# Patient Record
Sex: MALE | Race: OTHER | HISPANIC OR LATINO | URBAN - METROPOLITAN AREA
[De-identification: names, ages, dates, MRNs, and addresses within clinical notes are randomized per-mention and may not be internally consistent; named-entity substitution may affect disease eponyms.]

---

## 2018-02-22 ENCOUNTER — EMERGENCY (EMERGENCY)
Facility: HOSPITAL | Age: 58
LOS: 1 days | Discharge: HOME | End: 2018-02-22
Attending: EMERGENCY MEDICINE
Payer: MEDICARE

## 2018-02-22 VITALS
HEART RATE: 90 BPM | OXYGEN SATURATION: 99 % | WEIGHT: 139.99 LBS | SYSTOLIC BLOOD PRESSURE: 135 MMHG | HEIGHT: 66 IN | DIASTOLIC BLOOD PRESSURE: 80 MMHG | RESPIRATION RATE: 18 BRPM | TEMPERATURE: 98 F

## 2018-02-22 PROCEDURE — 93005 ELECTROCARDIOGRAM TRACING: CPT

## 2018-02-22 PROCEDURE — 99283 EMERGENCY DEPT VISIT LOW MDM: CPT

## 2018-02-22 PROCEDURE — 96372 THER/PROPH/DIAG INJ SC/IM: CPT

## 2018-02-22 PROCEDURE — 99283 EMERGENCY DEPT VISIT LOW MDM: CPT | Mod: 25

## 2018-02-22 PROCEDURE — 93010 ELECTROCARDIOGRAM REPORT: CPT

## 2018-02-22 RX ORDER — DIAZEPAM 5 MG
2.5 TABLET ORAL ONCE
Qty: 0 | Refills: 0 | Status: DISCONTINUED | OUTPATIENT
Start: 2018-02-22 | End: 2018-02-22

## 2018-02-22 RX ORDER — KETOROLAC TROMETHAMINE 30 MG/ML
30 SYRINGE (ML) INJECTION ONCE
Qty: 0 | Refills: 0 | Status: DISCONTINUED | OUTPATIENT
Start: 2018-02-22 | End: 2018-02-22

## 2018-02-22 RX ADMIN — Medication 30 MILLIGRAM(S): at 09:19

## 2018-02-22 RX ADMIN — Medication 2.5 MILLIGRAM(S): at 09:19

## 2018-02-22 RX ADMIN — Medication 30 MILLIGRAM(S): at 09:44

## 2018-02-22 NOTE — ED PROVIDER NOTE - MEDICAL DECISION MAKING DETAILS
Reproducible tenderness to chest wall w/ history of similar, pt states due to medication side effect. Normal exam. Will do EKG, IM Toradol, reassess

## 2018-02-22 NOTE — ED PROVIDER NOTE - OBJECTIVE STATEMENT
58 y/o M pt w/ PMHx of Schizophrenia presents to ED c/o L sided rib pain x5 days. Pt reports that 2 years ago he went to the hospital for the same pain which he thinks is due to a medication side effect; pt received an injection which resolved his symptoms. Pt reports no recent falls or injury. Pt states that he takes Haldol and Cogentin. Pt denies SOB, nausea/vomiting, or any other complaints. NKDA. 56 y/o M pt w/ PMHx of Schizophrenia presents to ED c/o L sided rib pain x5 days. Pt reports that 2 years ago he went to the hospital for the same pain which he thinks is due to a medication side effect; pt received an injection which resolved his symptoms. Pt reports no recent falls or injury. Pt states that he takes Haldol and Cogentin. Pt denies SOB, dizziness, nausea/vomiting, or any other complaints. NKDA.

## 2018-07-25 ENCOUNTER — INPATIENT (INPATIENT)
Facility: HOSPITAL | Age: 58
LOS: 2 days | Discharge: NURSING, REHAB OR LONG-TERM CARE | DRG: 195 | End: 2018-07-28
Attending: INTERNAL MEDICINE | Admitting: INTERNAL MEDICINE
Payer: MEDICARE

## 2018-07-25 VITALS
WEIGHT: 138.01 LBS | RESPIRATION RATE: 18 BRPM | HEIGHT: 66 IN | SYSTOLIC BLOOD PRESSURE: 117 MMHG | OXYGEN SATURATION: 98 % | DIASTOLIC BLOOD PRESSURE: 82 MMHG | TEMPERATURE: 99 F | HEART RATE: 128 BPM

## 2018-07-25 DIAGNOSIS — J18.9 PNEUMONIA, UNSPECIFIED ORGANISM: ICD-10-CM

## 2018-07-25 DIAGNOSIS — Z29.9 ENCOUNTER FOR PROPHYLACTIC MEASURES, UNSPECIFIED: ICD-10-CM

## 2018-07-25 DIAGNOSIS — F20.9 SCHIZOPHRENIA, UNSPECIFIED: ICD-10-CM

## 2018-07-25 LAB
ALBUMIN SERPL ELPH-MCNC: 4 G/DL — SIGNIFICANT CHANGE UP (ref 3.5–5)
ALP SERPL-CCNC: 128 U/L — HIGH (ref 40–120)
ALT FLD-CCNC: 26 U/L DA — SIGNIFICANT CHANGE UP (ref 10–60)
ANION GAP SERPL CALC-SCNC: 9 MMOL/L — SIGNIFICANT CHANGE UP (ref 5–17)
AST SERPL-CCNC: 24 U/L — SIGNIFICANT CHANGE UP (ref 10–40)
BASOPHILS # BLD AUTO: 0.1 K/UL — SIGNIFICANT CHANGE UP (ref 0–0.2)
BASOPHILS NFR BLD AUTO: 0.6 % — SIGNIFICANT CHANGE UP (ref 0–2)
BILIRUB SERPL-MCNC: 0.8 MG/DL — SIGNIFICANT CHANGE UP (ref 0.2–1.2)
BUN SERPL-MCNC: 7 MG/DL — SIGNIFICANT CHANGE UP (ref 7–18)
CALCIUM SERPL-MCNC: 9.1 MG/DL — SIGNIFICANT CHANGE UP (ref 8.4–10.5)
CHLORIDE SERPL-SCNC: 101 MMOL/L — SIGNIFICANT CHANGE UP (ref 96–108)
CHOLEST SERPL-MCNC: 108 MG/DL — SIGNIFICANT CHANGE UP (ref 10–199)
CO2 SERPL-SCNC: 25 MMOL/L — SIGNIFICANT CHANGE UP (ref 22–31)
CREAT SERPL-MCNC: 0.92 MG/DL — SIGNIFICANT CHANGE UP (ref 0.5–1.3)
D DIMER BLD IA.RAPID-MCNC: 494 NG/ML DDU — HIGH
EOSINOPHIL # BLD AUTO: 0 K/UL — SIGNIFICANT CHANGE UP (ref 0–0.5)
EOSINOPHIL NFR BLD AUTO: 0.1 % — SIGNIFICANT CHANGE UP (ref 0–6)
GLUCOSE SERPL-MCNC: 101 MG/DL — HIGH (ref 70–99)
HBA1C BLD-MCNC: 5.5 % — SIGNIFICANT CHANGE UP (ref 4–5.6)
HCT VFR BLD CALC: 52.5 % — HIGH (ref 39–50)
HDLC SERPL-MCNC: 30 MG/DL — LOW (ref 40–125)
HGB BLD-MCNC: 17.6 G/DL — HIGH (ref 13–17)
LACTATE SERPL-SCNC: 2.3 MMOL/L — HIGH (ref 0.7–2)
LACTATE SERPL-SCNC: 2.8 MMOL/L — HIGH (ref 0.7–2)
LIPID PNL WITH DIRECT LDL SERPL: 66 MG/DL — SIGNIFICANT CHANGE UP
LYMPHOCYTES # BLD AUTO: 1.8 K/UL — SIGNIFICANT CHANGE UP (ref 1–3.3)
LYMPHOCYTES # BLD AUTO: 10.6 % — LOW (ref 13–44)
MCHC RBC-ENTMCNC: 29.3 PG — SIGNIFICANT CHANGE UP (ref 27–34)
MCHC RBC-ENTMCNC: 33.5 GM/DL — SIGNIFICANT CHANGE UP (ref 32–36)
MCV RBC AUTO: 87.4 FL — SIGNIFICANT CHANGE UP (ref 80–100)
MONOCYTES # BLD AUTO: 1.2 K/UL — HIGH (ref 0–0.9)
MONOCYTES NFR BLD AUTO: 7.1 % — SIGNIFICANT CHANGE UP (ref 2–14)
NEUTROPHILS # BLD AUTO: 13.6 K/UL — HIGH (ref 1.8–7.4)
NEUTROPHILS NFR BLD AUTO: 81.6 % — HIGH (ref 43–77)
PLATELET # BLD AUTO: 214 K/UL — SIGNIFICANT CHANGE UP (ref 150–400)
POTASSIUM SERPL-MCNC: 3.8 MMOL/L — SIGNIFICANT CHANGE UP (ref 3.5–5.3)
POTASSIUM SERPL-SCNC: 3.8 MMOL/L — SIGNIFICANT CHANGE UP (ref 3.5–5.3)
PROT SERPL-MCNC: 8.5 G/DL — HIGH (ref 6–8.3)
RBC # BLD: 6 M/UL — HIGH (ref 4.2–5.8)
RBC # FLD: 12.9 % — SIGNIFICANT CHANGE UP (ref 10.3–14.5)
SODIUM SERPL-SCNC: 135 MMOL/L — SIGNIFICANT CHANGE UP (ref 135–145)
TOTAL CHOLESTEROL/HDL RATIO MEASUREMENT: 3.6 RATIO — SIGNIFICANT CHANGE UP (ref 3.4–9.6)
TRIGL SERPL-MCNC: 62 MG/DL — SIGNIFICANT CHANGE UP (ref 10–149)
TSH SERPL-MCNC: 0.4 UU/ML — SIGNIFICANT CHANGE UP (ref 0.34–4.82)
WBC # BLD: 16.6 K/UL — HIGH (ref 3.8–10.5)
WBC # FLD AUTO: 16.6 K/UL — HIGH (ref 3.8–10.5)

## 2018-07-25 PROCEDURE — 71046 X-RAY EXAM CHEST 2 VIEWS: CPT | Mod: 26

## 2018-07-25 PROCEDURE — 99285 EMERGENCY DEPT VISIT HI MDM: CPT

## 2018-07-25 PROCEDURE — 99223 1ST HOSP IP/OBS HIGH 75: CPT | Mod: AI,GC

## 2018-07-25 PROCEDURE — 71275 CT ANGIOGRAPHY CHEST: CPT | Mod: 26

## 2018-07-25 RX ORDER — SODIUM CHLORIDE 9 MG/ML
3 INJECTION INTRAMUSCULAR; INTRAVENOUS; SUBCUTANEOUS EVERY 8 HOURS
Qty: 0 | Refills: 0 | Status: COMPLETED | OUTPATIENT
Start: 2018-07-25 | End: 2018-07-26

## 2018-07-25 RX ORDER — ENOXAPARIN SODIUM 100 MG/ML
60 INJECTION SUBCUTANEOUS
Qty: 0 | Refills: 0 | Status: DISCONTINUED | OUTPATIENT
Start: 2018-07-25 | End: 2018-07-26

## 2018-07-25 RX ORDER — HALOPERIDOL DECANOATE 100 MG/ML
1 INJECTION INTRAMUSCULAR
Qty: 0 | Refills: 0 | COMMUNITY

## 2018-07-25 RX ORDER — SODIUM CHLORIDE 9 MG/ML
1000 INJECTION INTRAMUSCULAR; INTRAVENOUS; SUBCUTANEOUS ONCE
Qty: 0 | Refills: 0 | Status: COMPLETED | OUTPATIENT
Start: 2018-07-25 | End: 2018-07-25

## 2018-07-25 RX ORDER — CEFTRIAXONE 500 MG/1
1 INJECTION, POWDER, FOR SOLUTION INTRAMUSCULAR; INTRAVENOUS EVERY 24 HOURS
Qty: 0 | Refills: 0 | Status: DISCONTINUED | OUTPATIENT
Start: 2018-07-25 | End: 2018-07-28

## 2018-07-25 RX ORDER — BENZTROPINE MESYLATE 1 MG
1 TABLET ORAL
Qty: 0 | Refills: 0 | Status: DISCONTINUED | OUTPATIENT
Start: 2018-07-25 | End: 2018-07-28

## 2018-07-25 RX ORDER — HALOPERIDOL DECANOATE 100 MG/ML
10 INJECTION INTRAMUSCULAR
Qty: 0 | Refills: 0 | Status: DISCONTINUED | OUTPATIENT
Start: 2018-07-25 | End: 2018-07-28

## 2018-07-25 RX ORDER — HALOPERIDOL DECANOATE 100 MG/ML
5 INJECTION INTRAMUSCULAR
Qty: 0 | Refills: 0 | Status: DISCONTINUED | OUTPATIENT
Start: 2018-07-25 | End: 2018-07-25

## 2018-07-25 RX ORDER — AZITHROMYCIN 500 MG/1
500 TABLET, FILM COATED ORAL ONCE
Qty: 0 | Refills: 0 | Status: COMPLETED | OUTPATIENT
Start: 2018-07-25 | End: 2018-07-25

## 2018-07-25 RX ORDER — SODIUM CHLORIDE 9 MG/ML
1000 INJECTION INTRAMUSCULAR; INTRAVENOUS; SUBCUTANEOUS
Qty: 0 | Refills: 0 | Status: DISCONTINUED | OUTPATIENT
Start: 2018-07-25 | End: 2018-07-28

## 2018-07-25 RX ORDER — IPRATROPIUM/ALBUTEROL SULFATE 18-103MCG
3 AEROSOL WITH ADAPTER (GRAM) INHALATION ONCE
Qty: 0 | Refills: 0 | Status: COMPLETED | OUTPATIENT
Start: 2018-07-25 | End: 2018-07-25

## 2018-07-25 RX ORDER — HALOPERIDOL DECANOATE 100 MG/ML
15 INJECTION INTRAMUSCULAR
Qty: 0 | Refills: 0 | Status: DISCONTINUED | OUTPATIENT
Start: 2018-07-25 | End: 2018-07-28

## 2018-07-25 RX ORDER — ACETAMINOPHEN 500 MG
650 TABLET ORAL ONCE
Qty: 0 | Refills: 0 | Status: COMPLETED | OUTPATIENT
Start: 2018-07-25 | End: 2018-07-25

## 2018-07-25 RX ORDER — DIPHENHYDRAMINE HCL 50 MG
25 CAPSULE ORAL ONCE
Qty: 0 | Refills: 0 | Status: COMPLETED | OUTPATIENT
Start: 2018-07-25 | End: 2018-07-25

## 2018-07-25 RX ORDER — AZITHROMYCIN 500 MG/1
500 TABLET, FILM COATED ORAL EVERY 24 HOURS
Qty: 0 | Refills: 0 | Status: DISCONTINUED | OUTPATIENT
Start: 2018-07-25 | End: 2018-07-28

## 2018-07-25 RX ORDER — BENZTROPINE MESYLATE 1 MG
1 TABLET ORAL
Qty: 0 | Refills: 0 | COMMUNITY

## 2018-07-25 RX ORDER — CEFTRIAXONE 500 MG/1
1 INJECTION, POWDER, FOR SOLUTION INTRAMUSCULAR; INTRAVENOUS ONCE
Qty: 0 | Refills: 0 | Status: COMPLETED | OUTPATIENT
Start: 2018-07-25 | End: 2018-07-25

## 2018-07-25 RX ADMIN — SODIUM CHLORIDE 100 MILLILITER(S): 9 INJECTION INTRAMUSCULAR; INTRAVENOUS; SUBCUTANEOUS at 22:44

## 2018-07-25 RX ADMIN — Medication 650 MILLIGRAM(S): at 22:43

## 2018-07-25 RX ADMIN — AZITHROMYCIN 250 MILLIGRAM(S): 500 TABLET, FILM COATED ORAL at 22:42

## 2018-07-25 RX ADMIN — Medication 650 MILLIGRAM(S): at 09:38

## 2018-07-25 RX ADMIN — Medication 25 MILLIGRAM(S): at 22:42

## 2018-07-25 RX ADMIN — AZITHROMYCIN 250 MILLIGRAM(S): 500 TABLET, FILM COATED ORAL at 12:10

## 2018-07-25 RX ADMIN — SODIUM CHLORIDE 1000 MILLILITER(S): 9 INJECTION INTRAMUSCULAR; INTRAVENOUS; SUBCUTANEOUS at 10:10

## 2018-07-25 RX ADMIN — SODIUM CHLORIDE 1000 MILLILITER(S): 9 INJECTION INTRAMUSCULAR; INTRAVENOUS; SUBCUTANEOUS at 12:10

## 2018-07-25 RX ADMIN — CEFTRIAXONE 100 GRAM(S): 500 INJECTION, POWDER, FOR SOLUTION INTRAMUSCULAR; INTRAVENOUS at 12:23

## 2018-07-25 RX ADMIN — SODIUM CHLORIDE 1000 MILLILITER(S): 9 INJECTION INTRAMUSCULAR; INTRAVENOUS; SUBCUTANEOUS at 09:37

## 2018-07-25 RX ADMIN — CEFTRIAXONE 100 GRAM(S): 500 INJECTION, POWDER, FOR SOLUTION INTRAMUSCULAR; INTRAVENOUS at 23:42

## 2018-07-25 RX ADMIN — Medication 3 MILLILITER(S): at 09:38

## 2018-07-25 NOTE — ED PROVIDER NOTE - MEDICAL DECISION MAKING DETAILS
Patient with subjective fever, tachycardic, shortness of breath. Will obtain labs, x-ray, nebulizer and reassess.

## 2018-07-25 NOTE — H&P ADULT - PROBLEM SELECTOR PLAN 4
IMPROVE VTE Individual Risk Assessment          RISK                                                          Points  [  ] Previous VTE                                                3  [  ] Thrombophilia                                             2  [  ] Lower limb paralysis                                   2        (unable to hold up >15 seconds)    [  ] Current Cancer                                             2         (within 6 months)  [  ] Immobilization > 24 hrs                              1  [  ] ICU/CCU stay > 24 hours                             1  [  ] Age > 60                                                         1    IMPROVE VTE Score: 0   No risk factors for PE

## 2018-07-25 NOTE — H&P ADULT - NSHPLABSRESULTS_GEN_ALL_CORE
17.6   16.6  )-----------( 214      ( 25 Jul 2018 10:22 )             52.5     07-25    135  |  101  |  7   ----------------------------<  101<H>  3.8   |  25  |  0.92    Ca    9.1      25 Jul 2018 10:22    TPro  8.5<H>  /  Alb  4.0  /  TBili  0.8  /  DBili  x   /  AST  24  /  ALT  26  /  AlkPhos  128<H>  07-25      < from: Xray Chest 2 Views PA/Lat (07.25.18 @ 09:23) >    Ill-defined opacities in the left upper lobe may represent pneumonia.  No pneumothorax or pleural effusion.   The cardiomediastinal silhouette is within normal limits in size.    IMPRESSION:  Ill-defined opacities in the left upper lobe may represent pneumonia.   Follow-up imaging to resolution is recommended.      < end of copied text >

## 2018-07-25 NOTE — H&P ADULT - NSHPPHYSICALEXAM_GEN_ALL_CORE
GENERAL: NAD , no distress   HEENT: Normocephalic;  conjunctivae and sclerae clear; moist mucous membranes;   NECK : supple  CHEST/LUNG: Clear to auscultation bilaterally with good air entry ; mild rales in all lobes of lungs   HEART: S1 S2  regular; no murmurs, gallops or rubs  ABDOMEN: Soft, Nontender, Nondistended; Bowel sounds present  EXTREMITIES: no cyanosis; no edema; no calf tenderness  SKIN: warm and dry; no rash  NERVOUS SYSTEM:  Awake and alert; Oriented  to place, person and time ; no new deficits

## 2018-07-25 NOTE — H&P ADULT - NSHPSOCIALHISTORY_GEN_ALL_CORE
Lives in group home   Active smoker , 20 pack years   Former alcoholic , quit 10 years ago   No drugs

## 2018-07-25 NOTE — H&P ADULT - HISTORY OF PRESENT ILLNESS
57 yo male from group home ( with 2 roommates ) with PMH of Schizophrenia , comes in with cough , fever , weakness going on for 3 days. Patient describes his symptoms as sudden in onset, with a dry cough ,which he says now become productive with white sputum just today. No fever , no chills , no nausea , vomiting , diarrhea. Denies any sick contacts or hospitalization. No night sweats / no weight or appetite changes.   No recent travel / immobilization / h/o blood clots.    In Ed , BP : 117/ 82 mm hg , HR : 128 , Temp : 98.8 F  cbc shows elevated white count with Left shift , elevated h/h , Lactate of 2.3   bmp wnl   CXR shows Left upper lobe infiltrate

## 2018-07-25 NOTE — ED ADULT NURSE NOTE - ED STAT RN HANDOFF DETAILS
endorsed to DOMENIC Vásquez in holding area in stable condition for continuation of care. pt a&ox3, admitted for pneumonia. 20G RAC. ambulatory.

## 2018-07-25 NOTE — H&P ADULT - ASSESSMENT
57 yo male from group home ( with 2 roommates ) with PMH of Schizophrenia , comes in with cough , fever , weakness going on for 3 days. Patient describes his symptoms as sudden in onset, with a dry cough ,which he says now become productive with white sputum just today. No fever , no chills , no nausea , vomiting , diarrhea. Denies any sick contacts or hospitalization.   No recent travel / immobilization / h/o blood clots.    In Ed , BP : 117/ 82 mm hg , HR : 128 , Temp : 98.8 F  cbc shows elevated white count with Left shift , elevated h/h , Lactate of 2.3   bmp wnl   CXR shows Left upper lobe infiltrate     Will admit to medicine for Pneumonia and rule out PE.

## 2018-07-25 NOTE — H&P ADULT - ATTENDING COMMENTS
History was reviewed and the patient was examined in the ED and discussed with Dr. Ocampo.   Patient came with c/o cough, no fever or chills, no weight loss.   Previously he was seen in the ED February 22 with c/o pain in the left shoulder.    In ED he was found to be tachycardic.      PMH is positive for schizophrenia.  He lives in a group home.     Alert, cooperative man in NAD  Vital Signs Last 24 Hrs  T(C): 37.1 (25 Jul 2018 15:40), Max: 37.1 (25 Jul 2018 08:09)  T(F): 98.7 (25 Jul 2018 15:40), Max: 98.8 (25 Jul 2018 08:09)  HR: 133 (25 Jul 2018 15:40) (128 - 133)  BP: 144/83 (25 Jul 2018 15:40) (117/82 - 144/83)  BP(mean): --  RR: 18 (25 Jul 2018 15:40) (18 - 18)  SpO2: 98% (25 Jul 2018 15:40) (98% - 98%)  Lungs, bilateral air entry.  Bronchial breath sounds over the upper lung fields bilaterally.  No rales, no egophony, no post-tussive rales.   Cor, RRR  Abdomen, soft  Neurological, non-focal                          17.6   16.6  )-----------( 214      ( 25 Jul 2018 10:22 )             52.5       07-25    135  |  101  |  7   ----------------------------<  101<H>  3.8   |  25  |  0.92    Ca    9.1      25 Jul 2018 10:22    TPro  8.5<H>  /  Alb  4.0  /  TBili  0.8  /  DBili  x   /  AST  24  /  ALT  26  /  AlkPhos  128<H>  07-25                      Lactate Trend  07-25 @ 13:53 Lactate:2.8   07-25 @ 09:39 Lactate:2.3             CAPILLARY BLOOD GLUCOSE      POCT Blood Glucose.: 91 mg/dL (25 Jul 2018 16:16)        EKG shows sinus tachycardia    IMP: Cough and tachycardia give concern for pulmonary embolism.  R/o hyperthyroidism.   TABBY infiltrate and previous ED visit give concern for pulmonary TB.   Plan: CT angio to r/o PE          AFB isolation          Sputum AFB x 3          Quantiferon in AM          Empirical therapy for CAP          Urine Legionella antigen          TSH

## 2018-07-25 NOTE — ED PROVIDER NOTE - CARE PLAN
Principal Discharge DX:	PNA (pneumonia)  Secondary Diagnosis:	Tachycardia  Secondary Diagnosis:	Schizophrenia

## 2018-07-25 NOTE — ED ADULT NURSE NOTE - OBJECTIVE STATEMENT
pt a&ox3, here with c/o cough. pt states dry non-productive cough x3-4 days with headache and subjective fever. denies n/v, dizziness, CP.

## 2018-07-25 NOTE — H&P ADULT - PROBLEM SELECTOR PLAN 2
Patient is in no distress , however sats up to 93 with sinus tachycardia   unexplained by present symptoms except infection / dehydration   Will get d-dimer , as low probability of PE as of now

## 2018-07-25 NOTE — H&P ADULT - PROBLEM SELECTOR PLAN 1
Patient comes in with cough , low grade fever , headache with sob and chest tightness with cough  White count with leucocytosis   CXR s/o Left upper lobe infiltrate   Will start treatment with Rocephin and Zithromax   f/u blood cultures   Obtain sputum cultures if possible   Can send urine legionella , no gi symptoms or hyponatremia noted   Monitor for fever , hypotension , tachycardia

## 2018-07-26 LAB
24R-OH-CALCIDIOL SERPL-MCNC: 42.7 NG/ML — SIGNIFICANT CHANGE UP (ref 30–80)
ANION GAP SERPL CALC-SCNC: 6 MMOL/L — SIGNIFICANT CHANGE UP (ref 5–17)
APPEARANCE UR: CLEAR — SIGNIFICANT CHANGE UP
BASOPHILS # BLD AUTO: 0 K/UL — SIGNIFICANT CHANGE UP (ref 0–0.2)
BASOPHILS NFR BLD AUTO: 0.3 % — SIGNIFICANT CHANGE UP (ref 0–2)
BILIRUB UR-MCNC: NEGATIVE — SIGNIFICANT CHANGE UP
BUN SERPL-MCNC: 8 MG/DL — SIGNIFICANT CHANGE UP (ref 7–18)
CALCIUM SERPL-MCNC: 8.6 MG/DL — SIGNIFICANT CHANGE UP (ref 8.4–10.5)
CHLORIDE SERPL-SCNC: 109 MMOL/L — HIGH (ref 96–108)
CO2 SERPL-SCNC: 27 MMOL/L — SIGNIFICANT CHANGE UP (ref 22–31)
COLOR SPEC: YELLOW — SIGNIFICANT CHANGE UP
CREAT SERPL-MCNC: 0.74 MG/DL — SIGNIFICANT CHANGE UP (ref 0.5–1.3)
CULTURE RESULTS: NO GROWTH — SIGNIFICANT CHANGE UP
DIFF PNL FLD: NEGATIVE — SIGNIFICANT CHANGE UP
EOSINOPHIL # BLD AUTO: 0.1 K/UL — SIGNIFICANT CHANGE UP (ref 0–0.5)
EOSINOPHIL NFR BLD AUTO: 0.9 % — SIGNIFICANT CHANGE UP (ref 0–6)
GLUCOSE SERPL-MCNC: 96 MG/DL — SIGNIFICANT CHANGE UP (ref 70–99)
GLUCOSE UR QL: NEGATIVE — SIGNIFICANT CHANGE UP
HCT VFR BLD CALC: 45.2 % — SIGNIFICANT CHANGE UP (ref 39–50)
HGB BLD-MCNC: 14.9 G/DL — SIGNIFICANT CHANGE UP (ref 13–17)
KETONES UR-MCNC: ABNORMAL
LEGIONELLA AG UR QL: NEGATIVE — SIGNIFICANT CHANGE UP
LEUKOCYTE ESTERASE UR-ACNC: NEGATIVE — SIGNIFICANT CHANGE UP
LYMPHOCYTES # BLD AUTO: 1.7 K/UL — SIGNIFICANT CHANGE UP (ref 1–3.3)
LYMPHOCYTES # BLD AUTO: 13.1 % — SIGNIFICANT CHANGE UP (ref 13–44)
MAGNESIUM SERPL-MCNC: 2.2 MG/DL — SIGNIFICANT CHANGE UP (ref 1.6–2.6)
MCHC RBC-ENTMCNC: 29.2 PG — SIGNIFICANT CHANGE UP (ref 27–34)
MCHC RBC-ENTMCNC: 33 GM/DL — SIGNIFICANT CHANGE UP (ref 32–36)
MCV RBC AUTO: 88.5 FL — SIGNIFICANT CHANGE UP (ref 80–100)
MONOCYTES # BLD AUTO: 0.8 K/UL — SIGNIFICANT CHANGE UP (ref 0–0.9)
MONOCYTES NFR BLD AUTO: 6.4 % — SIGNIFICANT CHANGE UP (ref 2–14)
NEUTROPHILS # BLD AUTO: 10.1 K/UL — HIGH (ref 1.8–7.4)
NEUTROPHILS NFR BLD AUTO: 79.3 % — HIGH (ref 43–77)
NIGHT BLUE STAIN TISS: SIGNIFICANT CHANGE UP
NITRITE UR-MCNC: NEGATIVE — SIGNIFICANT CHANGE UP
PH UR: 7 — SIGNIFICANT CHANGE UP (ref 5–8)
PHOSPHATE SERPL-MCNC: 2.1 MG/DL — LOW (ref 2.5–4.5)
PLATELET # BLD AUTO: 172 K/UL — SIGNIFICANT CHANGE UP (ref 150–400)
POTASSIUM SERPL-MCNC: 4.3 MMOL/L — SIGNIFICANT CHANGE UP (ref 3.5–5.3)
POTASSIUM SERPL-SCNC: 4.3 MMOL/L — SIGNIFICANT CHANGE UP (ref 3.5–5.3)
PROT UR-MCNC: NEGATIVE — SIGNIFICANT CHANGE UP
RBC # BLD: 5.1 M/UL — SIGNIFICANT CHANGE UP (ref 4.2–5.8)
RBC # FLD: 12.6 % — SIGNIFICANT CHANGE UP (ref 10.3–14.5)
SODIUM SERPL-SCNC: 142 MMOL/L — SIGNIFICANT CHANGE UP (ref 135–145)
SP GR SPEC: 1 — LOW (ref 1.01–1.02)
SPECIMEN SOURCE: SIGNIFICANT CHANGE UP
SPECIMEN SOURCE: SIGNIFICANT CHANGE UP
UROBILINOGEN FLD QL: NEGATIVE — SIGNIFICANT CHANGE UP
VIT B12 SERPL-MCNC: 308 PG/ML — SIGNIFICANT CHANGE UP (ref 232–1245)
WBC # BLD: 12.7 K/UL — HIGH (ref 3.8–10.5)
WBC # FLD AUTO: 12.7 K/UL — HIGH (ref 3.8–10.5)

## 2018-07-26 PROCEDURE — 99233 SBSQ HOSP IP/OBS HIGH 50: CPT | Mod: GC

## 2018-07-26 RX ORDER — SODIUM,POTASSIUM PHOSPHATES 278-250MG
1 POWDER IN PACKET (EA) ORAL
Qty: 0 | Refills: 0 | Status: COMPLETED | OUTPATIENT
Start: 2018-07-26 | End: 2018-07-28

## 2018-07-26 RX ADMIN — CEFTRIAXONE 100 GRAM(S): 500 INJECTION, POWDER, FOR SOLUTION INTRAMUSCULAR; INTRAVENOUS at 22:28

## 2018-07-26 RX ADMIN — ENOXAPARIN SODIUM 60 MILLIGRAM(S): 100 INJECTION SUBCUTANEOUS at 05:30

## 2018-07-26 RX ADMIN — Medication 1 TABLET(S): at 12:42

## 2018-07-26 RX ADMIN — Medication 1 TABLET(S): at 10:41

## 2018-07-26 RX ADMIN — Medication 1 TABLET(S): at 22:29

## 2018-07-26 RX ADMIN — HALOPERIDOL DECANOATE 10 MILLIGRAM(S): 100 INJECTION INTRAMUSCULAR at 05:30

## 2018-07-26 RX ADMIN — Medication 1 TABLET(S): at 17:18

## 2018-07-26 RX ADMIN — Medication 1 MILLIGRAM(S): at 05:30

## 2018-07-26 RX ADMIN — Medication 1 MILLIGRAM(S): at 17:18

## 2018-07-26 RX ADMIN — AZITHROMYCIN 250 MILLIGRAM(S): 500 TABLET, FILM COATED ORAL at 22:28

## 2018-07-26 NOTE — PROGRESS NOTE ADULT - SUBJECTIVE AND OBJECTIVE BOX
PGY 2 Note discussed with primary attending    Patient is a 58y old  Male who presents with a chief complaint of cough , low grade fever (25 Jul 2018 12:36)      INTERVAL HPI/OVERNIGHT EVENTS: No acute events reported overnight.    Today pt presents in no acute distress.  Pt found resting comfortably in bed.  No nnew complaints reported today      MEDICATIONS  (STANDING):  azithromycin  IVPB 500 milliGRAM(s) IV Intermittent every 24 hours  benztropine 1 milliGRAM(s) Oral two times a day  cefTRIAXone   IVPB 1 Gram(s) IV Intermittent every 24 hours  enoxaparin Injectable 60 milliGRAM(s) SubCutaneous two times a day  haloperidol     Tablet 10 milliGRAM(s) Oral <User Schedule>  haloperidol     Tablet 15 milliGRAM(s) Oral <User Schedule>  sodium chloride 0.9%. 1000 milliLiter(s) (100 mL/Hr) IV Continuous <Continuous>    MEDICATIONS  (PRN):  guaiFENesin    Syrup 100 milliGRAM(s) Oral every 6 hours PRN Cough      __________________________________________________  REVIEW OF SYSTEMS:    CONSTITUTIONAL: No fever,   EYES: no acute visual disturbances  NECK: No pain or stiffness  RESPIRATORY: + cough; No shortness of breath  CARDIOVASCULAR: No chest pain, no palpitations  GASTROINTESTINAL: No pain. No nausea or vomiting; No diarrhea   NEUROLOGICAL: No headache or numbness, no tremors  MUSCULOSKELETAL: No joint pain, no muscle pain      Vital Signs Last 24 Hrs  T(C): 36.7 (26 Jul 2018 05:49), Max: 38.4 (25 Jul 2018 22:04)  T(F): 98 (26 Jul 2018 05:49), Max: 101.2 (25 Jul 2018 22:04)  HR: 99 (26 Jul 2018 05:49) (99 - 133)  BP: 120/76 (26 Jul 2018 05:49) (117/82 - 144/83)  RR: 18 (26 Jul 2018 05:49) (18 - 18)  SpO2: 97% (26 Jul 2018 05:49) (96% - 98%)    ________________________________________________  PHYSICAL EXAM:  GENERAL: NAD  HEENT: Normocephalic;  conjunctivae and sclerae clear; moist mucous membranes;   NECK : supple  CHEST/LUNG: Clear to auscultation bilaterally with good air entry   HEART: S1 S2  regular; no murmurs, gallops or rubs  ABDOMEN: Soft, Nontender, Nondistended; Bowel sounds present  EXTREMITIES: no cyanosis; no edema; no calf tenderness  NERVOUS SYSTEM:  Awake and alert; Oriented  to place, person and time ; no new deficits    _________________________________________________  LABS:        CAPILLARY BLOOD GLUCOSE      POCT Blood Glucose.: 91 mg/dL (25 Jul 2018 16:16)        RADIOLOGY & ADDITIONAL TESTS:    Imaging Personally Reviewed:  YES    Consultant(s) Notes Reviewed:   YES    Care Discussed with Consultants :     Plan of care was discussed with patient and /or primary care giver; all questions and concerns were addressed and care was aligned with patient's wishes.

## 2018-07-26 NOTE — CONSULT NOTE ADULT - GASTROINTESTINAL DETAILS
no rigidity/no organomegaly/nontender/no distention/no masses palpable/bowel sounds normal/soft/no guarding

## 2018-07-26 NOTE — CONSULT NOTE ADULT - ASSESSMENT
Multifocal pneumonia - doubt Tuberculosis  fever  leukocytosis    plan - cont rocephin 1 gm iv q24 hrs  cont azithromycin 500mgs iv q24 hrs  3 sputums to r/o TB

## 2018-07-26 NOTE — PROGRESS NOTE ADULT - ASSESSMENT
59 yo male from group home ( with 2 roommates ) with PMH of Schizophrenia , comes in with cough , fever , weakness going on for 3 days. Patient describes his symptoms as sudden in onset, with a dry cough ,which he says now become productive with white sputum just today. No fever , no chills , no nausea , vomiting , diarrhea. Denies any sick contacts or hospitalization.   No recent travel / immobilization / h/o blood clots.    In Ed , BP : 117/ 82 mm hg , HR : 128 , Temp : 98.8 F  cbc shows elevated white count with Left shift , elevated h/h , Lactate of 2.3   bmp wnl   CXR shows Left upper lobe infiltrate     Will admit to medicine for Pneumonia and rule out PE. 57 yo male from group home ( with 2 roommates ) with PMH of Schizophrenia , comes in with cough , fever , weakness going on for 3 days. Patient describes his symptoms as sudden in onset, with a dry cough ,which he says now become productive with white sputum just today. No fever , no chills , no nausea , vomiting , diarrhea. Denies any sick contacts or hospitalization.   No recent travel / immobilization / h/o blood clots.    In Ed , BP : 117/ 82 mm hg , HR : 128 , Temp : 98.8 F  cbc shows elevated white count with Left shift , elevated h/h , Lactate of 2.3   bmp wnl   CXR shows Left upper lobe infiltrate     Admitted to medicine for Pneumonia

## 2018-07-26 NOTE — PROGRESS NOTE ADULT - PROBLEM SELECTOR PLAN 1
-Patient comes in with cough , low grade fever , headache with sob and chest tightness with cough  -White count with leucocytosis   -CT chest sig for multilobar pneumonia  -continue with Rocephin and Zithromax   -f/u blood cultures   -Obtain sputum cultures  -f/u urine legionella  -Monitor for fever, hypotension, tachycardia -Patient comes in with cough , low grade fever , headache with sob and chest tightness with cough  -White count with leucocytosis   -CT chest sig for multilobar pneumonia  -continue with Rocephin and Zithromax   -f/u blood cultures   -Obtain sputum cultures- 1/2 sent   -f/u qgold   -f/u urine legionella  -Monitor for fever, hypotension, tachycardia -Patient comes in with cough , low grade fever , headache with sob and chest tightness with cough  -White count with leucocytosis   -CT chest sig for multilobar pneumonia  -continue with Rocephin and Zithromax   -f/u blood cultures   -Obtain sputum cultures- 1/3 sent   -f/u qgold   -f/u urine legionella  -Monitor for fever, hypotension, tachycardia

## 2018-07-26 NOTE — PROGRESS NOTE ADULT - ATTENDING COMMENTS
Patient  was examined and discussed with Dr. Phan.     He continues to have cough, which he says he has had for "a few days."    Alert, cooperative man in NAD  Vital Signs Last 24 Hrs  T(C): 36.1 (26 Jul 2018 15:00), Max: 38.4 (25 Jul 2018 22:04)  T(F): 97 (26 Jul 2018 15:00), Max: 101.2 (25 Jul 2018 22:04)  HR: 97 (26 Jul 2018 15:00) (97 - 125)  BP: 133/77 (26 Jul 2018 15:00) (120/76 - 133/77)  BP(mean): --  RR: 17 (26 Jul 2018 15:00) (17 - 18)  SpO2: 100% (26 Jul 2018 15:00) (96% - 100%)  Lungs, rales and egophony R mid lung, bronchial bs in upper lobes  Cor, RRR  Abdomen, soft  Neurological, intact                        14.9   12.7  )-----------( 172      ( 26 Jul 2018 07:52 )             45.2   07-26    142  |  109<H>  |  8   ----------------------------<  96  4.3   |  27  |  0.74    Ca    8.6      26 Jul 2018 07:52  Phos  2.1     07-26  Mg     2.2     07-26    TPro  8.5<H>  /  Alb  4.0  /  TBili  0.8  /  DBili  x   /  AST  24  /  ALT  26  /  AlkPhos  128<H>  07-25    Legionella Antigen, Urine: Negative (07.25.18 @ 23:01)    < from: CT Angio Chest w/ IV Cont (07.25.18 @ 16:12) >      Limited evaluation for segmental and subsegmental pulmonary embolism. No   main, left, right, or lobar pulmonary embolism.    Multifocal pneumonia. Recommend follow-up in 3-4 weeks after treatment.    Centrilobular emphysema.    Solid right lower lobe nodule measuring 7 mm. Repeat chest CT in 3-6   months is recommended for follow-up evaluation.    < end of copied text >    IMP:  No evidence of pulmonary embolism.           Multilobar pneumonia.  Clinical picture is most c/w mycoplasma, but TB needs to be excluded in this patient  Plan:  Empirical antibiotics for CAP           AFB isolation.  Sputum AFB x 3.  One has already been received.            ID consultation, Dr. Wolf

## 2018-07-26 NOTE — CONSULT NOTE ADULT - SUBJECTIVE AND OBJECTIVE BOX
HPI:  59 yo male from group home ( with 2 roommates ) with PMH of Schizophrenia , comes in with cough , fever , weakness going on for 3 days. Patient describes his symptoms as sudden in onset, with a dry cough ,which he says now become productive with white sputum just today. No fever , no chills , no nausea , vomiting , diarrhea. Denies any sick contacts or hospitalization. No night sweats / no weight or appetite changes.   No recent travel / immobilization / h/o blood clots.          PAST MEDICAL & SURGICAL HISTORY:  Schizophrenia  No significant past surgical history      No Known Allergies      Meds:  azithromycin  IVPB 500 milliGRAM(s) IV Intermittent every 24 hours  benztropine 1 milliGRAM(s) Oral two times a day  cefTRIAXone   IVPB 1 Gram(s) IV Intermittent every 24 hours  guaiFENesin    Syrup 100 milliGRAM(s) Oral every 6 hours PRN  haloperidol     Tablet 10 milliGRAM(s) Oral <User Schedule>  haloperidol     Tablet 15 milliGRAM(s) Oral <User Schedule>  potassium acid phosphate/sodium acid phosphate tablet (K-PHOS No. 2) 1 Tablet(s) Oral four times a day with meals  sodium chloride 0.9%. 1000 milliLiter(s) IV Continuous <Continuous>      SOCIAL HISTORY:  Smoker:    ETOH use:      FAMILY HISTORY:      VITALS:  Vital Signs Last 24 Hrs  T(C): 36.7 (26 Jul 2018 05:49), Max: 38.4 (25 Jul 2018 22:04)  T(F): 98 (26 Jul 2018 05:49), Max: 101.2 (25 Jul 2018 22:04)  HR: 99 (26 Jul 2018 05:49) (99 - 133)  BP: 120/76 (26 Jul 2018 05:49) (120/76 - 144/83)  BP(mean): --  RR: 18 (26 Jul 2018 05:49) (18 - 18)  SpO2: 97% (26 Jul 2018 05:49) (96% - 98%)    LABS/DIAGNOSTIC TESTS:                          14.9   12.7  )-----------( 172      ( 26 Jul 2018 07:52 )             45.2     WBC Count: 12.7 K/uL (07-26 @ 07:52)  WBC Count: 16.6 K/uL (07-25 @ 10:22)      07-26    142  |  109<H>  |  8   ----------------------------<  96  4.3   |  27  |  0.74    Ca    8.6      26 Jul 2018 07:52  Phos  2.1     07-26  Mg     2.2     07-26    TPro  8.5<H>  /  Alb  4.0  /  TBili  0.8  /  DBili  x   /  AST  24  /  ALT  26  /  AlkPhos  128<H>  07-25          LIVER FUNCTIONS - ( 25 Jul 2018 10:22 )  Alb: 4.0 g/dL / Pro: 8.5 g/dL / ALK PHOS: 128 U/L / ALT: 26 U/L DA / AST: 24 U/L / GGT: x                 LACTATE:Lactate, Blood: 2.8 mmol/L (07-25 @ 13:53)      ABG -     CULTURES:       RADIOLOGY:  < from: CT Angio Chest w/ IV Cont (07.25.18 @ 16:12) >  EXAM:  CT ANGIO CHEST (W)AW IC                            PROCEDURE DATE:  07/25/2018          INTERPRETATION:  CLINICAL INFORMATION: Cough. Tachypnea.    COMPARISON: None    PROCEDURE:   CT Angiography of the Chest.  90 ml of Omnipaque 350 was injected intravenously. 10 ml were discarded.  Sagittal and coronal reformats were performed as well as 3D (MIP)   reconstructions.      FINDINGS:    LUNGS AND LARGE AIRWAYS: Patent central airways. Severe bronchial wall   thickening and areas of mucoid impaction within the bilateral lower lobes.    Moderate-severe centrilobular emphysema. Consolidation within all lobes   with sparing of the right middle lobe consistent with multifocal   pneumonia. Right middle lobe nodule measuring 7 mm (3, 94).    PLEURA: Trace bilateral pleural effusions.    VESSELS: Limited evaluation for segmental and subsegmental pulmonary   embolism. No main, left, right, or lobar pulmonary embolism. The main   pulmonary arteries normal in diameter. No aortic aneurysm or dissection.    HEART: Cardiomegaly. No pericardial effusion. No CT evidence of right   heart strain.    MEDIASTINUM AND ANNELIESE: Subcentimeter mediastinal and bilateral hilar lymph   nodes.    CHEST WALL AND LOWER NECK: Within normal limits.    VISUALIZEDUPPER ABDOMEN: Cholelithiasis.    BONES: Within normal limits.    IMPRESSION:     Limited evaluation for segmental and subsegmental pulmonary embolism. No   main, left, right, or lobar pulmonary embolism.    Multifocal pneumonia. Recommend follow-up in 3-4 weeks after treatment.    Centrilobular emphysema.    Solid right lower lobe nodule measuring 7 mm. Repeat chest CT in 3-6   months is recommended for follow-up evaluation.    -----------------------------------------------------------------------------------------------------------------------------------------    < from: Xray Chest 2 Views PA/Lat (07.25.18 @ 09:23) >  EXAM:  XR CHEST PA LAT 2V                            PROCEDURE DATE:  07/25/2018          INTERPRETATION:  PA AND LATERAL CHEST X-RAYS    HISTORY: 58 years old. Male. cough.    COMPARISON: No prior study is available for comparison.    FINDINGS:  Ill-defined opacities in the left upper lobe may represent pneumonia.  No pneumothorax or pleural effusion.   The cardiomediastinal silhouette is within normal limits in size.    IMPRESSION:  Ill-defined opacities in the left upper lobe may represent pneumonia.   Follow-up imaging to resolution is recommended.      < end of copied text >    ROS  [  ] UNABLE TO ELICIT HPI:  57 yo male from group home ( with 2 roommates ) with PMH of Schizophrenia , comes in with cough , fever , weakness going on for 3 days. Patient describes his symptoms as sudden in onset, with a dry cough ,which he says now become productive with white sputum just today. No fever , no chills , no nausea , vomiting , diarrhea. Denies any sick contacts or hospitalization. No night sweats / no weight or appetite changes.   No recent travel / immobilization / h/o blood clots.  pt denies any hemoptysis and his symptoms have only been around for 3-4 days, he has not had any weight loss and his roommates are not ill. He has no night sweats either.          PAST MEDICAL & SURGICAL HISTORY:  Schizophrenia  No significant past surgical history      No Known Allergies      Meds:  azithromycin  IVPB 500 milliGRAM(s) IV Intermittent every 24 hours  benztropine 1 milliGRAM(s) Oral two times a day  cefTRIAXone   IVPB 1 Gram(s) IV Intermittent every 24 hours  guaiFENesin    Syrup 100 milliGRAM(s) Oral every 6 hours PRN  haloperidol     Tablet 10 milliGRAM(s) Oral <User Schedule>  haloperidol     Tablet 15 milliGRAM(s) Oral <User Schedule>  potassium acid phosphate/sodium acid phosphate tablet (K-PHOS No. 2) 1 Tablet(s) Oral four times a day with meals  sodium chloride 0.9%. 1000 milliLiter(s) IV Continuous <Continuous>      SOCIAL HISTORY:  Smoker: no   ETOH use: no     FAMILY HISTORY: not contributory      VITALS:  Vital Signs Last 24 Hrs  T(C): 36.7 (26 Jul 2018 05:49), Max: 38.4 (25 Jul 2018 22:04)  T(F): 98 (26 Jul 2018 05:49), Max: 101.2 (25 Jul 2018 22:04)  HR: 99 (26 Jul 2018 05:49) (99 - 133)  BP: 120/76 (26 Jul 2018 05:49) (120/76 - 144/83)  BP(mean): --  RR: 18 (26 Jul 2018 05:49) (18 - 18)  SpO2: 97% (26 Jul 2018 05:49) (96% - 98%)    LABS/DIAGNOSTIC TESTS:                          14.9   12.7  )-----------( 172      ( 26 Jul 2018 07:52 )             45.2     WBC Count: 12.7 K/uL (07-26 @ 07:52)  WBC Count: 16.6 K/uL (07-25 @ 10:22)      07-26    142  |  109<H>  |  8   ----------------------------<  96  4.3   |  27  |  0.74    Ca    8.6      26 Jul 2018 07:52  Phos  2.1     07-26  Mg     2.2     07-26    TPro  8.5<H>  /  Alb  4.0  /  TBili  0.8  /  DBili  x   /  AST  24  /  ALT  26  /  AlkPhos  128<H>  07-25          LIVER FUNCTIONS - ( 25 Jul 2018 10:22 )  Alb: 4.0 g/dL / Pro: 8.5 g/dL / ALK PHOS: 128 U/L / ALT: 26 U/L DA / AST: 24 U/L / GGT: x                 LACTATE:Lactate, Blood: 2.8 mmol/L (07-25 @ 13:53)      ABG -     CULTURES:       RADIOLOGY:  < from: CT Angio Chest w/ IV Cont (07.25.18 @ 16:12) >  EXAM:  CT ANGIO CHEST (W)AW IC                            PROCEDURE DATE:  07/25/2018          INTERPRETATION:  CLINICAL INFORMATION: Cough. Tachypnea.    COMPARISON: None    PROCEDURE:   CT Angiography of the Chest.  90 ml of Omnipaque 350 was injected intravenously. 10 ml were discarded.  Sagittal and coronal reformats were performed as well as 3D (MIP)   reconstructions.      FINDINGS:    LUNGS AND LARGE AIRWAYS: Patent central airways. Severe bronchial wall   thickening and areas of mucoid impaction within the bilateral lower lobes.    Moderate-severe centrilobular emphysema. Consolidation within all lobes   with sparing of the right middle lobe consistent with multifocal   pneumonia. Right middle lobe nodule measuring 7 mm (3, 94).    PLEURA: Trace bilateral pleural effusions.    VESSELS: Limited evaluation for segmental and subsegmental pulmonary   embolism. No main, left, right, or lobar pulmonary embolism. The main   pulmonary arteries normal in diameter. No aortic aneurysm or dissection.    HEART: Cardiomegaly. No pericardial effusion. No CT evidence of right   heart strain.    MEDIASTINUM AND ANNELIESE: Subcentimeter mediastinal and bilateral hilar lymph   nodes.    CHEST WALL AND LOWER NECK: Within normal limits.    VISUALIZEDUPPER ABDOMEN: Cholelithiasis.    BONES: Within normal limits.    IMPRESSION:     Limited evaluation for segmental and subsegmental pulmonary embolism. No   main, left, right, or lobar pulmonary embolism.    Multifocal pneumonia. Recommend follow-up in 3-4 weeks after treatment.    Centrilobular emphysema.    Solid right lower lobe nodule measuring 7 mm. Repeat chest CT in 3-6   months is recommended for follow-up evaluation.    -----------------------------------------------------------------------------------------------------------------------------------------    < from: Xray Chest 2 Views PA/Lat (07.25.18 @ 09:23) >  EXAM:  XR CHEST PA LAT 2V                            PROCEDURE DATE:  07/25/2018          INTERPRETATION:  PA AND LATERAL CHEST X-RAYS    HISTORY: 58 years old. Male. cough.    COMPARISON: No prior study is available for comparison.    FINDINGS:  Ill-defined opacities in the left upper lobe may represent pneumonia.  No pneumothorax or pleural effusion.   The cardiomediastinal silhouette is within normal limits in size.    IMPRESSION:  Ill-defined opacities in the left upper lobe may represent pneumonia.   Follow-up imaging to resolution is recommended.      < end of copied text >    ROS  [  ] UNABLE TO ELICIT

## 2018-07-27 ENCOUNTER — TRANSCRIPTION ENCOUNTER (OUTPATIENT)
Age: 58
End: 2018-07-27

## 2018-07-27 LAB
ANION GAP SERPL CALC-SCNC: 10 MMOL/L — SIGNIFICANT CHANGE UP (ref 5–17)
BUN SERPL-MCNC: 6 MG/DL — LOW (ref 7–18)
CALCIUM SERPL-MCNC: 8.9 MG/DL — SIGNIFICANT CHANGE UP (ref 8.4–10.5)
CHLORIDE SERPL-SCNC: 106 MMOL/L — SIGNIFICANT CHANGE UP (ref 96–108)
CO2 SERPL-SCNC: 23 MMOL/L — SIGNIFICANT CHANGE UP (ref 22–31)
CREAT SERPL-MCNC: 0.7 MG/DL — SIGNIFICANT CHANGE UP (ref 0.5–1.3)
GLUCOSE SERPL-MCNC: 80 MG/DL — SIGNIFICANT CHANGE UP (ref 70–99)
HCT VFR BLD CALC: 45.7 % — SIGNIFICANT CHANGE UP (ref 39–50)
HGB BLD-MCNC: 15.2 G/DL — SIGNIFICANT CHANGE UP (ref 13–17)
HIV 1+2 AB+HIV1 P24 AG SERPL QL IA: SIGNIFICANT CHANGE UP
M TB TUBERC IFN-G BLD QL: -0.02 IU/ML — SIGNIFICANT CHANGE UP
M TB TUBERC IFN-G BLD QL: 0.04 IU/ML — SIGNIFICANT CHANGE UP
M TB TUBERC IFN-G BLD QL: NEGATIVE — SIGNIFICANT CHANGE UP
MCHC RBC-ENTMCNC: 29.1 PG — SIGNIFICANT CHANGE UP (ref 27–34)
MCHC RBC-ENTMCNC: 33.4 GM/DL — SIGNIFICANT CHANGE UP (ref 32–36)
MCV RBC AUTO: 87.3 FL — SIGNIFICANT CHANGE UP (ref 80–100)
MITOGEN IGNF BCKGRD COR BLD-ACNC: 0.84 IU/ML — SIGNIFICANT CHANGE UP
NIGHT BLUE STAIN TISS: SIGNIFICANT CHANGE UP
PLATELET # BLD AUTO: 212 K/UL — SIGNIFICANT CHANGE UP (ref 150–400)
POTASSIUM SERPL-MCNC: 3.8 MMOL/L — SIGNIFICANT CHANGE UP (ref 3.5–5.3)
POTASSIUM SERPL-SCNC: 3.8 MMOL/L — SIGNIFICANT CHANGE UP (ref 3.5–5.3)
RBC # BLD: 5.24 M/UL — SIGNIFICANT CHANGE UP (ref 4.2–5.8)
RBC # FLD: 12.1 % — SIGNIFICANT CHANGE UP (ref 10.3–14.5)
SODIUM SERPL-SCNC: 139 MMOL/L — SIGNIFICANT CHANGE UP (ref 135–145)
SPECIMEN SOURCE: SIGNIFICANT CHANGE UP
WBC # BLD: 9.8 K/UL — SIGNIFICANT CHANGE UP (ref 3.8–10.5)
WBC # FLD AUTO: 9.8 K/UL — SIGNIFICANT CHANGE UP (ref 3.8–10.5)

## 2018-07-27 RX ORDER — NICOTINE POLACRILEX 2 MG
2 GUM BUCCAL
Qty: 0 | Refills: 0 | Status: DISCONTINUED | OUTPATIENT
Start: 2018-07-27 | End: 2018-07-27

## 2018-07-27 RX ORDER — NICOTINE POLACRILEX 2 MG
2 GUM BUCCAL EVERY 6 HOURS
Qty: 0 | Refills: 0 | Status: DISCONTINUED | OUTPATIENT
Start: 2018-07-27 | End: 2018-07-27

## 2018-07-27 RX ORDER — NICOTINE POLACRILEX 2 MG
2 GUM BUCCAL EVERY 6 HOURS
Qty: 0 | Refills: 0 | Status: DISCONTINUED | OUTPATIENT
Start: 2018-07-27 | End: 2018-07-28

## 2018-07-27 RX ADMIN — Medication 2 MILLIGRAM(S): at 17:44

## 2018-07-27 RX ADMIN — CEFTRIAXONE 100 GRAM(S): 500 INJECTION, POWDER, FOR SOLUTION INTRAMUSCULAR; INTRAVENOUS at 23:32

## 2018-07-27 RX ADMIN — HALOPERIDOL DECANOATE 10 MILLIGRAM(S): 100 INJECTION INTRAMUSCULAR at 06:17

## 2018-07-27 RX ADMIN — Medication 1 MILLIGRAM(S): at 06:17

## 2018-07-27 RX ADMIN — Medication 1 TABLET(S): at 12:17

## 2018-07-27 RX ADMIN — Medication 1 TABLET(S): at 08:56

## 2018-07-27 RX ADMIN — Medication 2 MILLIGRAM(S): at 23:58

## 2018-07-27 RX ADMIN — Medication 1 TABLET(S): at 22:14

## 2018-07-27 RX ADMIN — AZITHROMYCIN 250 MILLIGRAM(S): 500 TABLET, FILM COATED ORAL at 22:14

## 2018-07-27 RX ADMIN — Medication 1 TABLET(S): at 17:44

## 2018-07-27 RX ADMIN — Medication 1 MILLIGRAM(S): at 17:44

## 2018-07-27 NOTE — DISCHARGE NOTE ADULT - PLAN OF CARE
Please complete antibiotics and follow up with your PCP You presented with a cough and were found to have a multifocal pneumonia.  You were started on IV abx for community acquired pneumonia.  You were ruled out for Tb.   Please continue current antibiotic regimen, and schedule a close follow  up with your PCP. Please continue current medication regimen, and follow up with your PCP

## 2018-07-27 NOTE — DISCHARGE NOTE ADULT - PATIENT PORTAL LINK FT
You can access the [facility] Patient Portal, offered by [facility], by registering with the following website: [email or web address]

## 2018-07-27 NOTE — PROGRESS NOTE ADULT - PROBLEM SELECTOR PLAN 1
-Patient comes in with cough , low grade fever , headache with sob and chest tightness with cough  -White count with leucocytosis   -CT chest sig for multilobar pneumonia  -continue with Rocephin and Zithromax   -f/u blood cultures   -Obtain sputum cultures- 2/3 sent   -f/u qgold   -urine legionella neg  -Monitor for fever, hypotension, tachycardia -Patient comes in with cough , low grade fever , headache with sob and chest tightness with cough  -elev white count with leukocytosis   -CT chest sig for multilobar pneumonia  -continue with Rocephin and Zithromax   -blood cultures NGTD  -Obtain sputum cultures- 3/3 sent - 1/3 neg  -f/u qgold   -urine legionella neg  -Monitor for fever, hypotension, tachycardia

## 2018-07-27 NOTE — PROGRESS NOTE ADULT - SUBJECTIVE AND OBJECTIVE BOX
PGY 2 Note discussed with primary attending    Patient is a 58y old  Male who presents with a chief complaint of cough , low grade fever (25 Jul 2018 12:36)      INTERVAL HPI/OVERNIGHT EVENTS: No acute events reported overnight.    Today pt presents in no acute distress.  Pt found resting comfortably in bed.  Cough improving. No nnew complaints reported today    MEDICATIONS  (STANDING):  azithromycin  IVPB 500 milliGRAM(s) IV Intermittent every 24 hours  benztropine 1 milliGRAM(s) Oral two times a day  cefTRIAXone   IVPB 1 Gram(s) IV Intermittent every 24 hours  haloperidol     Tablet 10 milliGRAM(s) Oral <User Schedule>  haloperidol     Tablet 15 milliGRAM(s) Oral <User Schedule>  potassium acid phosphate/sodium acid phosphate tablet (K-PHOS No. 2) 1 Tablet(s) Oral four times a day with meals  sodium chloride 0.9%. 1000 milliLiter(s) (100 mL/Hr) IV Continuous <Continuous>    MEDICATIONS  (PRN):  guaiFENesin    Syrup 100 milliGRAM(s) Oral every 6 hours PRN Cough  ________________________________________________  REVIEW OF SYSTEMS:    CONSTITUTIONAL: No fever,   EYES: no acute visual disturbances  NECK: No pain or stiffness  RESPIRATORY: + cough; No shortness of breath  CARDIOVASCULAR: No chest pain, no palpitations  GASTROINTESTINAL: No pain. No nausea or vomiting; No diarrhea   NEUROLOGICAL: No headache or numbness, no tremors  MUSCULOSKELETAL: No joint pain, no muscle pain    Vital Signs Last 24 Hrs  T(C): 36.8 (27 Jul 2018 05:45), Max: 36.8 (27 Jul 2018 05:45)  T(F): 98.3 (27 Jul 2018 05:45), Max: 98.3 (27 Jul 2018 05:45)  HR: 85 (27 Jul 2018 05:45) (85 - 97)  BP: 129/85 (27 Jul 2018 05:45) (121/68 - 133/77)  RR: 17 (27 Jul 2018 05:45) (17 - 17)  SpO2: 95% (27 Jul 2018 05:45) (95% - 100%)    ________________________________________________  PHYSICAL EXAM:  GENERAL: NAD  HEENT: Normocephalic;  conjunctivae and sclerae clear; moist mucous membranes;   NECK : supple  CHEST/LUNG: Ronchorus breath sounds bilat  HEART: S1 S2  regular; no murmurs, gallops or rubs  ABDOMEN: Soft, Nontender, Nondistended; Bowel sounds present  EXTREMITIES: no cyanosis; no edema; no calf tenderness  NERVOUS SYSTEM:  Awake and alert; Oriented  to place, person and time ; no new deficits    _________________________________________________  LABS:        CAPILLARY BLOOD GLUCOSE      POCT Blood Glucose.: 91 mg/dL (25 Jul 2018 16:16)        RADIOLOGY & ADDITIONAL TESTS:    Imaging Personally Reviewed:  YES    Consultant(s) Notes Reviewed:   YES    Care Discussed with Consultants :     Plan of care was discussed with patient and /or primary care giver; all questions and concerns were addressed and care was aligned with patient's wishes. PGY 2 Note discussed with primary attending    Patient is a 58y old  Male who presents with a chief complaint of cough , low grade fever (25 Jul 2018 12:36)      INTERVAL HPI/OVERNIGHT EVENTS: No acute events reported overnight.    Today pt presents in no acute distress.  Pt found resting comfortably in bed.  Cough improving. Pt requesting nicotine gum.  No new complaints reported today    MEDICATIONS  (STANDING):  azithromycin  IVPB 500 milliGRAM(s) IV Intermittent every 24 hours  benztropine 1 milliGRAM(s) Oral two times a day  cefTRIAXone   IVPB 1 Gram(s) IV Intermittent every 24 hours  haloperidol     Tablet 10 milliGRAM(s) Oral <User Schedule>  haloperidol     Tablet 15 milliGRAM(s) Oral <User Schedule>  potassium acid phosphate/sodium acid phosphate tablet (K-PHOS No. 2) 1 Tablet(s) Oral four times a day with meals  sodium chloride 0.9%. 1000 milliLiter(s) (100 mL/Hr) IV Continuous <Continuous>    MEDICATIONS  (PRN):  guaiFENesin    Syrup 100 milliGRAM(s) Oral every 6 hours PRN Cough  ________________________________________________  REVIEW OF SYSTEMS:    CONSTITUTIONAL: No fever,   EYES: no acute visual disturbances  NECK: No pain or stiffness  RESPIRATORY: + cough; No shortness of breath  CARDIOVASCULAR: No chest pain, no palpitations  GASTROINTESTINAL: No pain. No nausea or vomiting; No diarrhea   NEUROLOGICAL: No headache or numbness, no tremors  MUSCULOSKELETAL: No joint pain, no muscle pain    Vital Signs Last 24 Hrs  T(C): 36.8 (27 Jul 2018 05:45), Max: 36.8 (27 Jul 2018 05:45)  T(F): 98.3 (27 Jul 2018 05:45), Max: 98.3 (27 Jul 2018 05:45)  HR: 85 (27 Jul 2018 05:45) (85 - 97)  BP: 129/85 (27 Jul 2018 05:45) (121/68 - 133/77)  RR: 17 (27 Jul 2018 05:45) (17 - 17)  SpO2: 95% (27 Jul 2018 05:45) (95% - 100%)    ________________________________________________  PHYSICAL EXAM:  GENERAL: NAD  HEENT: Normocephalic;  conjunctivae and sclerae clear; moist mucous membranes;   NECK : supple  CHEST/LUNG: Ronchorus breath sounds bilat  HEART: S1 S2  regular; no murmurs, gallops or rubs  ABDOMEN: Soft, Nontender, Nondistended; Bowel sounds present  EXTREMITIES: no cyanosis; no edema; no calf tenderness  NERVOUS SYSTEM:  Awake and alert; Oriented  to place, person and time ; no new deficits    _________________________________________________  LABS:                          15.2   9.8   )-----------( 212      ( 27 Jul 2018 08:34 )             45.7     07-27    139  |  106  |  6<L>  ----------------------------<  80  3.8   |  23  |  0.70    Ca    8.9      27 Jul 2018 08:34  Phos  2.1     07-26  Mg     2.2     07-26        CAPILLARY BLOOD GLUCOSE  POCT Blood Glucose.: 91 mg/dL (25 Jul 2018 16:16)        RADIOLOGY & ADDITIONAL TESTS:  CT Angio Chest w/ IV Cont (07.25.18 @ 16:12) >  IMPRESSION:     Limited evaluation for segmental and subsegmental pulmonary embolism. No   main, left, right, or lobar pulmonary embolism.    Multifocal pneumonia. Recommend follow-up in 3-4 weeks after treatment.    Centrilobular emphysema.    Solid right lower lobe nodule measuring 7 mm. Repeat chest CT in 3-6   months is recommended for follow-up evaluation.        Imaging Personally Reviewed:  YES    Consultant(s) Notes Reviewed:   YES    Care Discussed with Consultants :     Plan of care was discussed with patient and /or primary care giver; all questions and concerns were addressed and care was aligned with patient's wishes.

## 2018-07-27 NOTE — PROGRESS NOTE ADULT - ATTENDING COMMENTS
Attending Medicine Covering Dr. Dc    Patient seen and examined; Agree with PGY 2 A/P above with editing as needed and stated below.     Patient is doing much better; cough improving; 2 AFB sputum negative; 3rd set testing; Wants to go out and c/o headache staying in the room.       Vital Signs Last 24 Hrs  T(C): 36.7 (27 Jul 2018 14:10), Max: 36.8 (27 Jul 2018 05:45)  T(F): 98 (27 Jul 2018 14:10), Max: 98.3 (27 Jul 2018 05:45)  HR: 92 (27 Jul 2018 14:10) (85 - 95)  BP: 136/94 (27 Jul 2018 14:10) (121/68 - 136/94)  RR: 18 (27 Jul 2018 14:10) (17 - 18)  SpO2: 100% (27 Jul 2018 14:10) (95% - 100%)    P/E; As above  Neuro: AAO x3; No focal deficits  CVS: S1S2 present, regular  Resp: No wheezing today; BLAE+  GI: Soft, BS+, NT  Extr: No edema or calf tenderness B/L LE    Labs:                        15.2   9.8   )-----------( 212      ( 27 Jul 2018 08:34 )             45.7   07-27    139  |  106  |  6<L>  ----------------------------<  80  3.8   |  23  |  0.70    Ca    8.9      27 Jul 2018 08:34  Phos  2.1     07-26  Mg     2.2     07-26    Culture - Acid Fast - Sputum w/Smear . (07.27.18 @ 00:42)    Acid Fast Bacilli Smear: No acid fast bacilli seen by fluorochrome stain  Culture - Acid Fast - Sputum w/Smear . (07.26.18 @ 13:46)    Acid Fast Bacilli Smear: No acid fast bacilli seen by fluorochrome stain                      Multilobar pneumonia concern for Atypical PNA, doubt Pulmonary TB    Plan:  Empirical antibiotics for CAP           AFB isolation. until Sputum AFB x 3 negaitve.  2 negative;   F/U Quantiferon Gold         d/w  ID consultation, Dr. Wolf; if negative AFB x 3, D/c Home over the weekend  Discussed with  Marisela and Patient outpatient  at bedside;   Patient is AAO x3 and can be discharged home even on weekend  Marisela,  on call this weekend, will arrange transportation if needed    Discussed with PGY2 Dr. Phan; will complete D/C Note

## 2018-07-27 NOTE — PROGRESS NOTE ADULT - SUBJECTIVE AND OBJECTIVE BOX
58y Male    Meds:  azithromycin  IVPB 500 milliGRAM(s) IV Intermittent every 24 hours  cefTRIAXone   IVPB 1 Gram(s) IV Intermittent every 24 hours    Allergies    No Known Allergies    Intolerances        VITALS:  Vital Signs Last 24 Hrs  T(C): 36.7 (27 Jul 2018 14:10), Max: 36.8 (27 Jul 2018 05:45)  T(F): 98 (27 Jul 2018 14:10), Max: 98.3 (27 Jul 2018 05:45)  HR: 92 (27 Jul 2018 14:10) (85 - 95)  BP: 136/94 (27 Jul 2018 14:10) (121/68 - 136/94)  BP(mean): --  RR: 18 (27 Jul 2018 14:10) (17 - 18)  SpO2: 100% (27 Jul 2018 14:10) (95% - 100%)    LABS/DIAGNOSTIC TESTS:                          15.2   9.8   )-----------( 212      ( 27 Jul 2018 08:34 )             45.7         07-27    139  |  106  |  6<L>  ----------------------------<  80  3.8   |  23  |  0.70    Ca    8.9      27 Jul 2018 08:34  Phos  2.1     07-26  Mg     2.2     07-26            CULTURES: .Sputum Sputum  07-27 @ 00:42 --  --  --      .Sputum Sputumconical  07-26 @ 13:46 --  --  --      .Urine Clean Catch (Midstream)  07-25 @ 23:02   No growth  --  --      .Blood Blood-Peripheral  07-25 @ 14:27   No growth to date.  --  --            RADIOLOGY:      ROS:  [  ] UNABLE TO ELICIT 58y Male who is feeling much better, he has a slight cough but no sob, 2 sputum for AFB are neg and the 3rd one is testing. He has no fevers or chills, no diarrhea.    Meds:  azithromycin  IVPB 500 milliGRAM(s) IV Intermittent every 24 hours  cefTRIAXone   IVPB 1 Gram(s) IV Intermittent every 24 hours    Allergies    No Known Allergies    Intolerances        VITALS:  Vital Signs Last 24 Hrs  T(C): 36.7 (27 Jul 2018 14:10), Max: 36.8 (27 Jul 2018 05:45)  T(F): 98 (27 Jul 2018 14:10), Max: 98.3 (27 Jul 2018 05:45)  HR: 92 (27 Jul 2018 14:10) (85 - 95)  BP: 136/94 (27 Jul 2018 14:10) (121/68 - 136/94)  BP(mean): --  RR: 18 (27 Jul 2018 14:10) (17 - 18)  SpO2: 100% (27 Jul 2018 14:10) (95% - 100%)    LABS/DIAGNOSTIC TESTS:                          15.2   9.8   )-----------( 212      ( 27 Jul 2018 08:34 )             45.7         07-27    139  |  106  |  6<L>  ----------------------------<  80  3.8   |  23  |  0.70    Ca    8.9      27 Jul 2018 08:34  Phos  2.1     07-26  Mg     2.2     07-26            CULTURES: .Sputum Sputum  07-27 @ 00:42 --  --  --      .Sputum Sputumconical  07-26 @ 13:46 --  --  --      .Urine Clean Catch (Midstream)  07-25 @ 23:02   No growth  --  --      .Blood Blood-Peripheral  07-25 @ 14:27   No growth to date.  --  --            RADIOLOGY:      ROS:  [  ] UNABLE TO ELICIT

## 2018-07-27 NOTE — DISCHARGE NOTE ADULT - CARE PLAN
Principal Discharge DX:	PNA (pneumonia)  Goal:	Please complete antibiotics and follow up with your PCP  Assessment and plan of treatment:	You presented with a cough and were found to have a multifocal pneumonia.  You were started on IV abx for community acquired pneumonia.  You were ruled out for Tb.   Please continue current antibiotic regimen, and schedule a close follow  up with your PCP.  Secondary Diagnosis:	Schizophrenia  Goal:	Please continue current medication regimen, and follow up with your PCP

## 2018-07-27 NOTE — PROGRESS NOTE ADULT - PROBLEM SELECTOR PLAN 4
IMPROVE VTE Individual Risk Assessment          RISK                                                          Points  [  ] Previous VTE                                                3  [  ] Thrombophilia                                             2  [  ] Lower limb paralysis                                   2        (unable to hold up >15 seconds)    [  ] Current Cancer                                             2         (within 6 months)  [  ] Immobilization > 24 hrs                              1  [  ] ICU/CCU stay > 24 hours                             1  [  ] Age > 60                                                         1    IMPROVE VTE Score: 0   No risk factors for PE
IMPROVE VTE Individual Risk Assessment          RISK                                                          Points  [  ] Previous VTE                                                3  [  ] Thrombophilia                                             2  [  ] Lower limb paralysis                                   2        (unable to hold up >15 seconds)    [  ] Current Cancer                                             2         (within 6 months)  [  ] Immobilization > 24 hrs                              1  [  ] ICU/CCU stay > 24 hours                             1  [  ] Age > 60                                                         1    IMPROVE VTE Score: 0   No risk factors for PE

## 2018-07-27 NOTE — PROGRESS NOTE ADULT - ASSESSMENT
pneumonia - pt doing much better clinically    plan - once 3rd sputum neg for [employer] can dc home tomorrow on Levaquin 750mgs po x 5 days  reconsult prn

## 2018-07-27 NOTE — DISCHARGE NOTE ADULT - MEDICATION SUMMARY - MEDICATIONS TO TAKE
I will START or STAY ON the medications listed below when I get home from the hospital:    benztropine 1 mg oral tablet  -- 1 tab(s) by mouth 2 times a day  -- Indication: For Schizophrenia    Haldol 5 mg oral tablet  -- 1 tab(s) by mouth 2 times a day on M-F, 1tab 3 times a day Saturday and Sunday  -- Indication: For Schizophrenia    Levaquin 750 mg oral tablet  -- 1 tab(s) by mouth once a day   -- Avoid prolonged or excessive exposure to direct and/or artificial sunlight while taking this medication.  Do not take dairy products, antacids, or iron preparations within one hour of this medication.  Finish all this medication unless otherwise directed by prescriber.  May cause drowsiness or dizziness.  Medication should be taken with plenty of water.    -- Indication: For PNA (pneumonia)

## 2018-07-27 NOTE — PROGRESS NOTE ADULT - ASSESSMENT
59 yo male from group home ( with 2 roommates ) with PMH of Schizophrenia , comes in with cough , fever , weakness going on for 3 days. Patient describes his symptoms as sudden in onset, with a dry cough ,which he says now become productive with white sputum just today. No fever , no chills , no nausea , vomiting , diarrhea. Denies any sick contacts or hospitalization.   No recent travel / immobilization / h/o blood clots.    In Ed , BP : 117/ 82 mm hg , HR : 128 , Temp : 98.8 F  cbc shows elevated white count with Left shift , elevated h/h , Lactate of 2.3   bmp wnl   CXR shows Left upper lobe infiltrate     Admitted to medicine for Pneumonia 59 yo male from group home ( with 2 roommates ) with PMH of Schizophrenia , comes in with cough , fever , weakness going on for 3 days. Patient describes his symptoms as sudden in onset, with a dry cough ,which he says now become productive with white sputum just today.     CXR shows Left upper lobe infiltrate     Admitted to medicine for Pneumonia and being ruled out for TB which is unlikely

## 2018-07-27 NOTE — DISCHARGE NOTE ADULT - HOSPITAL COURSE
57 yo male from group home ( with 2 roommates ) with PMH of Schizophrenia , comes in with cough , fever , weakness going on for 3 days. Patient describes his symptoms as sudden in onset, with a dry cough ,which he says now become productive with white sputum just today. No fever , no chills , no nausea , vomiting , diarrhea. DNo recent travel / immobilization / h/o blood clots.    In Ed , BP : 117/ 82 mm hg , HR : 128 , Temp : 98.8 F  cbc shows elevated white count with Left shift , elevated h/h , Lactate of 2.3   bmp wnl   CXR shows Left upper lobe infiltrate     Pt admitted to medicine for pneumonia.  CTA chest neg for PE, but concerning for multifocal PNA.    Pt was ruled out for Tb with 3 neg sputum samples.    Legionella negative   Mycoplasma  Quantiferon    Pt was seen by ID, Dr. Wolf, who continued Abx therapy  Pt current medically stable for discharge on PO Abx with follow up to his PCP within 1 week of dc. 57 yo male from group home ( with 2 roommates ) with PMH of Schizophrenia , comes in with cough , fever , weakness going on for 3 days. Patient describes his symptoms as sudden in onset, with a dry cough ,which he says now become productive with white sputum just today. No fever , no chills , no nausea , vomiting , diarrhea. DNo recent travel / immobilization / h/o blood clots.    In Ed , BP : 117/ 82 mm hg , HR : 128 , Temp : 98.8 F  cbc shows elevated white count with Left shift , elevated h/h , Lactate of 2.3   bmp wnl   CXR shows Left upper lobe infiltrate     Pt admitted to medicine for pneumonia.  CTA chest neg for PE, but concerning for multifocal PNA.    Pt was ruled out for Tb with 3 neg sputum samples.    Legionella negative   Quantiferon negative  Pt was seen by ID, Dr. Wolf, who continued Abx therapy and recommended to d/c on levaquin 750 mg once daily for 7 days  Pt current medically stable for discharge on PO Abx with follow up to his PCP within 1 week of dc.   Patient is stable for discharge per attending and is advised to follow up with PCP as outpatient   Please refer to patient's complete medical chart with documents for a full hospital course, for this is only a brief summary.

## 2018-07-28 VITALS
HEART RATE: 112 BPM | DIASTOLIC BLOOD PRESSURE: 81 MMHG | OXYGEN SATURATION: 100 % | SYSTOLIC BLOOD PRESSURE: 131 MMHG | RESPIRATION RATE: 18 BRPM | TEMPERATURE: 99 F

## 2018-07-28 LAB
HCT VFR BLD CALC: 44.5 % — SIGNIFICANT CHANGE UP (ref 39–50)
HGB BLD-MCNC: 14.8 G/DL — SIGNIFICANT CHANGE UP (ref 13–17)
MCHC RBC-ENTMCNC: 29.2 PG — SIGNIFICANT CHANGE UP (ref 27–34)
MCHC RBC-ENTMCNC: 33.3 GM/DL — SIGNIFICANT CHANGE UP (ref 32–36)
MCV RBC AUTO: 87.6 FL — SIGNIFICANT CHANGE UP (ref 80–100)
NIGHT BLUE STAIN TISS: SIGNIFICANT CHANGE UP
PLATELET # BLD AUTO: 262 K/UL — SIGNIFICANT CHANGE UP (ref 150–400)
RBC # BLD: 5.08 M/UL — SIGNIFICANT CHANGE UP (ref 4.2–5.8)
RBC # FLD: 12.4 % — SIGNIFICANT CHANGE UP (ref 10.3–14.5)
SPECIMEN SOURCE: SIGNIFICANT CHANGE UP
WBC # BLD: 11 K/UL — HIGH (ref 3.8–10.5)
WBC # FLD AUTO: 11 K/UL — HIGH (ref 3.8–10.5)

## 2018-07-28 PROCEDURE — 84100 ASSAY OF PHOSPHORUS: CPT

## 2018-07-28 PROCEDURE — 85027 COMPLETE CBC AUTOMATED: CPT

## 2018-07-28 PROCEDURE — 84443 ASSAY THYROID STIM HORMONE: CPT

## 2018-07-28 PROCEDURE — 36415 COLL VENOUS BLD VENIPUNCTURE: CPT

## 2018-07-28 PROCEDURE — 99285 EMERGENCY DEPT VISIT HI MDM: CPT | Mod: 25

## 2018-07-28 PROCEDURE — 80061 LIPID PANEL: CPT

## 2018-07-28 PROCEDURE — 94640 AIRWAY INHALATION TREATMENT: CPT

## 2018-07-28 PROCEDURE — 83036 HEMOGLOBIN GLYCOSYLATED A1C: CPT

## 2018-07-28 PROCEDURE — 85379 FIBRIN DEGRADATION QUANT: CPT

## 2018-07-28 PROCEDURE — 83735 ASSAY OF MAGNESIUM: CPT

## 2018-07-28 PROCEDURE — 86480 TB TEST CELL IMMUN MEASURE: CPT

## 2018-07-28 PROCEDURE — 71275 CT ANGIOGRAPHY CHEST: CPT

## 2018-07-28 PROCEDURE — 80048 BASIC METABOLIC PNL TOTAL CA: CPT

## 2018-07-28 PROCEDURE — 87086 URINE CULTURE/COLONY COUNT: CPT

## 2018-07-28 PROCEDURE — 71046 X-RAY EXAM CHEST 2 VIEWS: CPT

## 2018-07-28 PROCEDURE — 82607 VITAMIN B-12: CPT

## 2018-07-28 PROCEDURE — 87015 SPECIMEN INFECT AGNT CONCNTJ: CPT

## 2018-07-28 PROCEDURE — 80053 COMPREHEN METABOLIC PANEL: CPT

## 2018-07-28 PROCEDURE — 87389 HIV-1 AG W/HIV-1&-2 AB AG IA: CPT

## 2018-07-28 PROCEDURE — 87116 MYCOBACTERIA CULTURE: CPT

## 2018-07-28 PROCEDURE — 93005 ELECTROCARDIOGRAM TRACING: CPT

## 2018-07-28 PROCEDURE — 82306 VITAMIN D 25 HYDROXY: CPT

## 2018-07-28 PROCEDURE — 87040 BLOOD CULTURE FOR BACTERIA: CPT

## 2018-07-28 PROCEDURE — 87206 SMEAR FLUORESCENT/ACID STAI: CPT

## 2018-07-28 PROCEDURE — 86738 MYCOPLASMA ANTIBODY: CPT

## 2018-07-28 PROCEDURE — 82962 GLUCOSE BLOOD TEST: CPT

## 2018-07-28 PROCEDURE — 81003 URINALYSIS AUTO W/O SCOPE: CPT

## 2018-07-28 PROCEDURE — 87449 NOS EACH ORGANISM AG IA: CPT

## 2018-07-28 PROCEDURE — 83605 ASSAY OF LACTIC ACID: CPT

## 2018-07-28 RX ADMIN — Medication 2 MILLIGRAM(S): at 11:22

## 2018-07-28 RX ADMIN — Medication 1 TABLET(S): at 07:56

## 2018-07-28 RX ADMIN — Medication 2 MILLIGRAM(S): at 06:08

## 2018-07-28 RX ADMIN — Medication 1 MILLIGRAM(S): at 06:07

## 2018-07-28 RX ADMIN — HALOPERIDOL DECANOATE 15 MILLIGRAM(S): 100 INJECTION INTRAMUSCULAR at 06:07

## 2018-07-30 LAB
CULTURE RESULTS: SIGNIFICANT CHANGE UP
CULTURE RESULTS: SIGNIFICANT CHANGE UP
M PNEUMO IGM SER-ACNC: 88 UNITS/ML — SIGNIFICANT CHANGE UP
MYCOPLASMA AG SPEC QL: NEGATIVE — SIGNIFICANT CHANGE UP
SPECIMEN SOURCE: SIGNIFICANT CHANGE UP
SPECIMEN SOURCE: SIGNIFICANT CHANGE UP

## 2018-09-15 LAB
CULTURE RESULTS: SIGNIFICANT CHANGE UP
SPECIMEN SOURCE: SIGNIFICANT CHANGE UP

## 2018-10-29 ENCOUNTER — APPOINTMENT (OUTPATIENT)
Dept: SURGERY | Facility: CLINIC | Age: 58
End: 2018-10-29

## 2018-10-29 PROBLEM — Z00.00 ENCOUNTER FOR PREVENTIVE HEALTH EXAMINATION: Status: ACTIVE | Noted: 2018-10-29

## 2018-12-06 ENCOUNTER — APPOINTMENT (OUTPATIENT)
Dept: SURGERY | Facility: CLINIC | Age: 58
End: 2018-12-06

## 2019-02-11 ENCOUNTER — APPOINTMENT (OUTPATIENT)
Dept: SURGERY | Facility: CLINIC | Age: 59
End: 2019-02-11
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
## 2019-05-20 ENCOUNTER — APPOINTMENT (OUTPATIENT)
Dept: SURGERY | Facility: CLINIC | Age: 59
End: 2019-05-20
Payer: MEDICARE

## 2019-05-20 VITALS
SYSTOLIC BLOOD PRESSURE: 133 MMHG | WEIGHT: 139 LBS | HEART RATE: 105 BPM | BODY MASS INDEX: 22.34 KG/M2 | HEIGHT: 66 IN | DIASTOLIC BLOOD PRESSURE: 91 MMHG | OXYGEN SATURATION: 98 % | TEMPERATURE: 98 F

## 2019-05-20 DIAGNOSIS — R22.9 LOCALIZED SWELLING, MASS AND LUMP, UNSPECIFIED: ICD-10-CM

## 2019-05-20 DIAGNOSIS — F17.200 NICOTINE DEPENDENCE, UNSPECIFIED, UNCOMPLICATED: ICD-10-CM

## 2019-05-20 DIAGNOSIS — Z56.0 UNEMPLOYMENT, UNSPECIFIED: ICD-10-CM

## 2019-05-20 DIAGNOSIS — Z82.49 FAMILY HISTORY OF ISCHEMIC HEART DISEASE AND OTHER DISEASES OF THE CIRCULATORY SYSTEM: ICD-10-CM

## 2019-05-20 PROCEDURE — 99203 OFFICE O/P NEW LOW 30 MIN: CPT

## 2019-05-20 SDOH — ECONOMIC STABILITY - INCOME SECURITY: UNEMPLOYMENT, UNSPECIFIED: Z56.0

## 2019-05-20 NOTE — PHYSICAL EXAM
[Purpura] : no purpura  [Petechiae] : no petechiae [Skin Ulcer] : no ulcer [Skin Induration] : no induration [Alert] : alert [Oriented to Person] : oriented to person [Oriented to Place] : oriented to place [Oriented to Time] : oriented to time [Calm] : calm [de-identified] : The patient is alert, well-groomed, and cheerful. [de-identified] :  Neck supple. Trachea midline. Thyroid isthmus barely palpable, lobes not felt. [de-identified] : BL deltoit region Masses. They are mobile,Soft, Smooth, non-Tender,   Well defined. Superficial. No palpable lymph nodes. \par Mass size -6  cm x  6 cm.\par

## 2019-05-20 NOTE — ASSESSMENT
[FreeTextEntry1] : This is a 59 year old patient who was referred by Dr. Gabriel Bull with the chief complaint of having BL shoulder  masses.  Patient reports having this condition for 6 months. Patient denies any trauma to the area.  [facility]. patient reporting Haldol injections in BL shoulders for 30 years. on examination patient has BL deltoit region Masses. They are mobile,Soft, Smooth, non-Tender,   Well defined. Superficial. No palpable lymph nodes. \par Mass size -6  cm x  6 cm.\par

## 2019-05-20 NOTE — PLAN
[FreeTextEntry1] : Patient was told significance of findings, options, risks and benefits were explained.  We will arrange for pre-surgical testing and schedule the patient for BL deltoid mass excision   at [facility].\par

## 2019-05-20 NOTE — HISTORY OF PRESENT ILLNESS
[de-identified] : This is a 59 year old patient who was referred by Dr. Gabriel Bull with the chief complaint of having BL shoulder  masses.  Patient reports having this condition for 6 months. Patient denies any trauma to the area.  [facility]. patient reporting Haldol injections in BL shoulders for 30 years. on examination patient has BL deltoit region Masses. They are mobile,Soft, Smooth, non-Tender,   Well defined. Superficial. No palpable lymph nodes. \par Mass size -6  cm x  6 cm.\par

## 2019-06-19 ENCOUNTER — APPOINTMENT (OUTPATIENT)
Dept: SURGERY | Facility: HOSPITAL | Age: 59
End: 2019-06-19

## 2019-07-26 ENCOUNTER — APPOINTMENT (OUTPATIENT)
Dept: SURGERY | Facility: HOSPITAL | Age: 59
End: 2019-07-26

## 2021-02-04 ENCOUNTER — TRANSCRIPTION ENCOUNTER (OUTPATIENT)
Age: 61
End: 2021-02-04

## 2022-11-16 ENCOUNTER — EMERGENCY (EMERGENCY)
Facility: HOSPITAL | Age: 62
LOS: 1 days | Discharge: HOME | End: 2022-11-16
Attending: STUDENT IN AN ORGANIZED HEALTH CARE EDUCATION/TRAINING PROGRAM
Payer: COMMERCIAL

## 2022-11-16 VITALS
RESPIRATION RATE: 18 BRPM | DIASTOLIC BLOOD PRESSURE: 90 MMHG | HEART RATE: 101 BPM | TEMPERATURE: 99 F | SYSTOLIC BLOOD PRESSURE: 145 MMHG | OXYGEN SATURATION: 96 %

## 2022-11-16 VITALS
HEIGHT: 66 IN | DIASTOLIC BLOOD PRESSURE: 93 MMHG | TEMPERATURE: 98 F | SYSTOLIC BLOOD PRESSURE: 156 MMHG | OXYGEN SATURATION: 93 % | WEIGHT: 125 LBS | RESPIRATION RATE: 18 BRPM | HEART RATE: 110 BPM

## 2022-11-16 LAB
ALBUMIN SERPL ELPH-MCNC: 3.7 G/DL — SIGNIFICANT CHANGE UP (ref 3.5–5)
ALP SERPL-CCNC: 137 U/L — HIGH (ref 40–120)
ALT FLD-CCNC: 25 U/L DA — SIGNIFICANT CHANGE UP (ref 10–60)
ANION GAP SERPL CALC-SCNC: 6 MMOL/L — SIGNIFICANT CHANGE UP (ref 5–17)
APPEARANCE UR: CLEAR — SIGNIFICANT CHANGE UP
APTT BLD: 31.1 SEC — SIGNIFICANT CHANGE UP (ref 27.5–35.5)
AST SERPL-CCNC: 18 U/L — SIGNIFICANT CHANGE UP (ref 10–40)
BACTERIA # UR AUTO: ABNORMAL /HPF
BASOPHILS # BLD AUTO: 0.05 K/UL — SIGNIFICANT CHANGE UP (ref 0–0.2)
BASOPHILS NFR BLD AUTO: 0.3 % — SIGNIFICANT CHANGE UP (ref 0–2)
BILIRUB SERPL-MCNC: 0.7 MG/DL — SIGNIFICANT CHANGE UP (ref 0.2–1.2)
BILIRUB UR-MCNC: NEGATIVE — SIGNIFICANT CHANGE UP
BUN SERPL-MCNC: 6 MG/DL — LOW (ref 7–18)
CALCIUM SERPL-MCNC: 8.9 MG/DL — SIGNIFICANT CHANGE UP (ref 8.4–10.5)
CHLORIDE SERPL-SCNC: 110 MMOL/L — HIGH (ref 96–108)
CO2 SERPL-SCNC: 24 MMOL/L — SIGNIFICANT CHANGE UP (ref 22–31)
COLOR SPEC: YELLOW — SIGNIFICANT CHANGE UP
CREAT SERPL-MCNC: 0.85 MG/DL — SIGNIFICANT CHANGE UP (ref 0.5–1.3)
DIFF PNL FLD: NEGATIVE — SIGNIFICANT CHANGE UP
EGFR: 99 ML/MIN/1.73M2 — SIGNIFICANT CHANGE UP
EOSINOPHIL # BLD AUTO: 0.06 K/UL — SIGNIFICANT CHANGE UP (ref 0–0.5)
EOSINOPHIL NFR BLD AUTO: 0.4 % — SIGNIFICANT CHANGE UP (ref 0–6)
EPI CELLS # UR: ABNORMAL /HPF
GLUCOSE SERPL-MCNC: 124 MG/DL — HIGH (ref 70–99)
GLUCOSE UR QL: NEGATIVE — SIGNIFICANT CHANGE UP
HCT VFR BLD CALC: 47.5 % — SIGNIFICANT CHANGE UP (ref 39–50)
HGB BLD-MCNC: 16.6 G/DL — SIGNIFICANT CHANGE UP (ref 13–17)
IMM GRANULOCYTES NFR BLD AUTO: 0.4 % — SIGNIFICANT CHANGE UP (ref 0–0.9)
INR BLD: 1.07 RATIO — SIGNIFICANT CHANGE UP (ref 0.88–1.16)
KETONES UR-MCNC: NEGATIVE — SIGNIFICANT CHANGE UP
LEUKOCYTE ESTERASE UR-ACNC: NEGATIVE — SIGNIFICANT CHANGE UP
LYMPHOCYTES # BLD AUTO: 1.74 K/UL — SIGNIFICANT CHANGE UP (ref 1–3.3)
LYMPHOCYTES # BLD AUTO: 10.9 % — LOW (ref 13–44)
MCHC RBC-ENTMCNC: 30 PG — SIGNIFICANT CHANGE UP (ref 27–34)
MCHC RBC-ENTMCNC: 34.9 GM/DL — SIGNIFICANT CHANGE UP (ref 32–36)
MCV RBC AUTO: 85.7 FL — SIGNIFICANT CHANGE UP (ref 80–100)
MONOCYTES # BLD AUTO: 0.98 K/UL — HIGH (ref 0–0.9)
MONOCYTES NFR BLD AUTO: 6.1 % — SIGNIFICANT CHANGE UP (ref 2–14)
NEUTROPHILS # BLD AUTO: 13.08 K/UL — HIGH (ref 1.8–7.4)
NEUTROPHILS NFR BLD AUTO: 81.9 % — HIGH (ref 43–77)
NITRITE UR-MCNC: NEGATIVE — SIGNIFICANT CHANGE UP
NRBC # BLD: 0 /100 WBCS — SIGNIFICANT CHANGE UP (ref 0–0)
PH UR: 7 — SIGNIFICANT CHANGE UP (ref 5–8)
PLATELET # BLD AUTO: 287 K/UL — SIGNIFICANT CHANGE UP (ref 150–400)
POTASSIUM SERPL-MCNC: 4.2 MMOL/L — SIGNIFICANT CHANGE UP (ref 3.5–5.3)
POTASSIUM SERPL-SCNC: 4.2 MMOL/L — SIGNIFICANT CHANGE UP (ref 3.5–5.3)
PROT SERPL-MCNC: 7.1 G/DL — SIGNIFICANT CHANGE UP (ref 6–8.3)
PROT UR-MCNC: 15
PROTHROM AB SERPL-ACNC: 12.7 SEC — SIGNIFICANT CHANGE UP (ref 10.5–13.4)
RBC # BLD: 5.54 M/UL — SIGNIFICANT CHANGE UP (ref 4.2–5.8)
RBC # FLD: 12.5 % — SIGNIFICANT CHANGE UP (ref 10.3–14.5)
RBC CASTS # UR COMP ASSIST: SIGNIFICANT CHANGE UP /HPF (ref 0–2)
SODIUM SERPL-SCNC: 140 MMOL/L — SIGNIFICANT CHANGE UP (ref 135–145)
SP GR SPEC: 1 — LOW (ref 1.01–1.02)
UROBILINOGEN FLD QL: NEGATIVE — SIGNIFICANT CHANGE UP
WBC # BLD: 15.98 K/UL — HIGH (ref 3.8–10.5)
WBC # FLD AUTO: 15.98 K/UL — HIGH (ref 3.8–10.5)
WBC UR QL: SIGNIFICANT CHANGE UP /HPF (ref 0–5)

## 2022-11-16 PROCEDURE — 99285 EMERGENCY DEPT VISIT HI MDM: CPT

## 2022-11-16 PROCEDURE — 72125 CT NECK SPINE W/O DYE: CPT | Mod: MA

## 2022-11-16 PROCEDURE — 84484 ASSAY OF TROPONIN QUANT: CPT

## 2022-11-16 PROCEDURE — 71046 X-RAY EXAM CHEST 2 VIEWS: CPT

## 2022-11-16 PROCEDURE — 84100 ASSAY OF PHOSPHORUS: CPT

## 2022-11-16 PROCEDURE — 80053 COMPREHEN METABOLIC PANEL: CPT

## 2022-11-16 PROCEDURE — 71046 X-RAY EXAM CHEST 2 VIEWS: CPT | Mod: 26

## 2022-11-16 PROCEDURE — 70450 CT HEAD/BRAIN W/O DYE: CPT | Mod: MA

## 2022-11-16 PROCEDURE — 85730 THROMBOPLASTIN TIME PARTIAL: CPT

## 2022-11-16 PROCEDURE — 93005 ELECTROCARDIOGRAM TRACING: CPT

## 2022-11-16 PROCEDURE — 99285 EMERGENCY DEPT VISIT HI MDM: CPT | Mod: 25

## 2022-11-16 PROCEDURE — 36415 COLL VENOUS BLD VENIPUNCTURE: CPT

## 2022-11-16 PROCEDURE — 87086 URINE CULTURE/COLONY COUNT: CPT

## 2022-11-16 PROCEDURE — 81001 URINALYSIS AUTO W/SCOPE: CPT

## 2022-11-16 PROCEDURE — 85025 COMPLETE CBC W/AUTO DIFF WBC: CPT

## 2022-11-16 PROCEDURE — 83735 ASSAY OF MAGNESIUM: CPT

## 2022-11-16 PROCEDURE — 72125 CT NECK SPINE W/O DYE: CPT | Mod: 26,MA

## 2022-11-16 PROCEDURE — 70450 CT HEAD/BRAIN W/O DYE: CPT | Mod: 26,MA

## 2022-11-16 PROCEDURE — 85610 PROTHROMBIN TIME: CPT

## 2022-11-16 RX ORDER — MECLIZINE HCL 12.5 MG
25 TABLET ORAL ONCE
Refills: 0 | Status: COMPLETED | OUTPATIENT
Start: 2022-11-16 | End: 2022-11-16

## 2022-11-16 RX ORDER — SODIUM CHLORIDE 9 MG/ML
1000 INJECTION INTRAMUSCULAR; INTRAVENOUS; SUBCUTANEOUS ONCE
Refills: 0 | Status: COMPLETED | OUTPATIENT
Start: 2022-11-16 | End: 2022-11-16

## 2022-11-16 RX ORDER — ACETAMINOPHEN 500 MG
650 TABLET ORAL ONCE
Refills: 0 | Status: COMPLETED | OUTPATIENT
Start: 2022-11-16 | End: 2022-11-16

## 2022-11-16 RX ORDER — SODIUM,POTASSIUM PHOSPHATES 278-250MG
1 POWDER IN PACKET (EA) ORAL ONCE
Refills: 0 | Status: DISCONTINUED | OUTPATIENT
Start: 2022-11-16 | End: 2022-11-19

## 2022-11-16 RX ADMIN — Medication 25 MILLIGRAM(S): at 08:20

## 2022-11-16 RX ADMIN — Medication 650 MILLIGRAM(S): at 06:45

## 2022-11-16 RX ADMIN — SODIUM CHLORIDE 1000 MILLILITER(S): 9 INJECTION INTRAMUSCULAR; INTRAVENOUS; SUBCUTANEOUS at 06:45

## 2022-11-16 RX ADMIN — Medication 650 MILLIGRAM(S): at 07:15

## 2022-11-16 NOTE — ED PROVIDER NOTE - PHYSICAL EXAMINATION
CONSTITUTIONAL: non-toxic, well appearing, + airway intact, GCS 15  SKIN: no rash, no petechiae. no ecchymosis, no lacerations  EYES: PERRL, EOMI,  ENT: no hemotympanum, tongue midline, dry mucous membranes  NECK: Supple; midline tenderness over cervical spine, no thoracic-lumbar spine tenderness  CARD: RRR, equal radial pulses bilaterally 2+  RESP: Diminshed breath sounds bilaterally no respiratory distress, no crepitus over chest wall  ABD: Soft, non-tender, non-distended  PELVIS: stable  EXT: Normal ROM x4. no bony tenderness, equal strength bilaterally  NEURO: Alert, oriented. CN2-12 intact, equal strength bilaterally, finger to nose intact.   PSYCH: Cooperative, appropriate.
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
## 2022-11-16 NOTE — ED PROVIDER NOTE - NSFOLLOWUPCLINICS_GEN_ALL_ED_FT
[facility]  Internal Medicine  [address] [place] [address]  Phone: [phone]  Fax: [phone]  Follow Up Time: Routine

## 2022-11-16 NOTE — ED PROVIDER NOTE - PROGRESS NOTE DETAILS
Patient signed out to me by Dr. Mix, HR improving, tolerated PO, ambulating in Emergency Department and reporting feeling better, will discharge.
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
## 2022-11-16 NOTE — ED PROVIDER NOTE - PATIENT PORTAL LINK FT
You can access the FollowMyHealth Patient Portal offered by [facility] by registering at the following website: [email or web address]. By joining [employer]s FollowMyHealth portal, you will also be able to view your health information using other applications (apps) compatible with our system.
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
## 2022-11-16 NOTE — ED ADULT NURSE NOTE - NSIMPLEMENTINTERV_GEN_ALL_ED
Implemented All Universal Safety Interventions:  [place] to call system. Call bell, personal items and telephone within reach. Instruct patient to call for assistance. Room bathroom lighting operational. Non-slip footwear when patient is off stretcher. Physically safe environment: no spills, clutter or unnecessary equipment. Stretcher in lowest position, wheels locked, appropriate side rails in place.
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
## 2022-11-17 LAB
CULTURE RESULTS: SIGNIFICANT CHANGE UP
SPECIMEN SOURCE: SIGNIFICANT CHANGE UP

## 2023-03-19 ENCOUNTER — NON-APPOINTMENT (OUTPATIENT)
Age: 63
End: 2023-03-19

## 2023-08-22 NOTE — ED PROVIDER NOTE - SOCIAL CONCERNS
From: Eileen Ayala  To: Christen Herr  Sent: 8/22/2023 10:42 AM CDT  Subject: Mammogram results    Good Morning, wondering what you thought of my last rounds of testing I had done, lung, heart and Mammogram - I got a call back for the Mammogram, turns out was nothing but was quite the scare! Can I come in for a quick visit to go over all the test result s and how to move forward? and check my BP? Thanks!    None

## 2023-12-27 ENCOUNTER — NON-APPOINTMENT (OUTPATIENT)
Age: 63
End: 2023-12-27